# Patient Record
Sex: FEMALE | Race: WHITE | Employment: FULL TIME | URBAN - METROPOLITAN AREA
[De-identification: names, ages, dates, MRNs, and addresses within clinical notes are randomized per-mention and may not be internally consistent; named-entity substitution may affect disease eponyms.]

---

## 2018-07-31 ENCOUNTER — OFFICE VISIT (OUTPATIENT)
Dept: FAMILY MEDICINE CLINIC | Age: 53
End: 2018-07-31
Payer: COMMERCIAL

## 2018-07-31 VITALS
HEART RATE: 76 BPM | WEIGHT: 293 LBS | SYSTOLIC BLOOD PRESSURE: 138 MMHG | HEIGHT: 69 IN | DIASTOLIC BLOOD PRESSURE: 88 MMHG | BODY MASS INDEX: 43.4 KG/M2 | RESPIRATION RATE: 16 BRPM | TEMPERATURE: 98.6 F

## 2018-07-31 DIAGNOSIS — Z12.11 COLON CANCER SCREENING: ICD-10-CM

## 2018-07-31 DIAGNOSIS — I10 ESSENTIAL HYPERTENSION: Primary | ICD-10-CM

## 2018-07-31 DIAGNOSIS — E07.9 THYROID DYSFUNCTION: ICD-10-CM

## 2018-07-31 DIAGNOSIS — Z13.1 SCREENING FOR DIABETES MELLITUS: ICD-10-CM

## 2018-07-31 DIAGNOSIS — Z00.00 HEALTHCARE MAINTENANCE: ICD-10-CM

## 2018-07-31 DIAGNOSIS — R73.9 HYPERGLYCEMIA: ICD-10-CM

## 2018-07-31 DIAGNOSIS — Z12.39 BREAST CANCER SCREENING: ICD-10-CM

## 2018-07-31 PROBLEM — Z99.89 OSA ON CPAP: Status: ACTIVE | Noted: 2018-07-31

## 2018-07-31 PROBLEM — G47.33 OSA ON CPAP: Status: ACTIVE | Noted: 2018-07-31

## 2018-07-31 PROCEDURE — 3017F COLORECTAL CA SCREEN DOC REV: CPT | Performed by: FAMILY MEDICINE

## 2018-07-31 PROCEDURE — 99203 OFFICE O/P NEW LOW 30 MIN: CPT | Performed by: FAMILY MEDICINE

## 2018-07-31 PROCEDURE — G8417 CALC BMI ABV UP PARAM F/U: HCPCS | Performed by: FAMILY MEDICINE

## 2018-07-31 PROCEDURE — G8427 DOCREV CUR MEDS BY ELIG CLIN: HCPCS | Performed by: FAMILY MEDICINE

## 2018-07-31 PROCEDURE — 1036F TOBACCO NON-USER: CPT | Performed by: FAMILY MEDICINE

## 2018-07-31 RX ORDER — AMLODIPINE BESYLATE 10 MG/1
1 TABLET ORAL DAILY
Refills: 1 | COMMUNITY
Start: 2018-06-22 | End: 2018-07-31 | Stop reason: SDUPTHER

## 2018-07-31 RX ORDER — HYDROCHLOROTHIAZIDE 25 MG/1
1 TABLET ORAL DAILY
Refills: 1 | COMMUNITY
Start: 2018-06-22 | End: 2018-07-31 | Stop reason: SDUPTHER

## 2018-07-31 RX ORDER — AMLODIPINE BESYLATE 10 MG/1
10 TABLET ORAL DAILY
Qty: 90 TABLET | Refills: 1 | Status: SHIPPED | OUTPATIENT
Start: 2018-07-31 | End: 2022-06-20

## 2018-07-31 RX ORDER — HYDROCHLOROTHIAZIDE 25 MG/1
25 TABLET ORAL DAILY
Qty: 90 TABLET | Refills: 1 | Status: SHIPPED | OUTPATIENT
Start: 2018-07-31 | End: 2022-06-21

## 2018-07-31 ASSESSMENT — PATIENT HEALTH QUESTIONNAIRE - PHQ9
1. LITTLE INTEREST OR PLEASURE IN DOING THINGS: 0
SUM OF ALL RESPONSES TO PHQ QUESTIONS 1-9: 0
2. FEELING DOWN, DEPRESSED OR HOPELESS: 0
SUM OF ALL RESPONSES TO PHQ9 QUESTIONS 1 & 2: 0

## 2018-07-31 NOTE — PATIENT INSTRUCTIONS
Patient Education        High Blood Pressure: Care Instructions  Your Care Instructions    If your blood pressure is usually above 130/80, you have high blood pressure, or hypertension. That means the top number is 130 or higher or the bottom number is 80 or higher, or both. Despite what a lot of people think, high blood pressure usually doesn't cause headaches or make you feel dizzy or lightheaded. It usually has no symptoms. But it does increase your risk for heart attack, stroke, and kidney or eye damage. The higher your blood pressure, the more your risk increases. Your doctor will give you a goal for your blood pressure. Your goal will be based on your health and your age. Lifestyle changes, such as eating healthy and being active, are always important to help lower blood pressure. You might also take medicine to reach your blood pressure goal.  Follow-up care is a key part of your treatment and safety. Be sure to make and go to all appointments, and call your doctor if you are having problems. It's also a good idea to know your test results and keep a list of the medicines you take. How can you care for yourself at home? Medical treatment  · If you stop taking your medicine, your blood pressure will go back up. You may take one or more types of medicine to lower your blood pressure. Be safe with medicines. Take your medicine exactly as prescribed. Call your doctor if you think you are having a problem with your medicine. · Talk to your doctor before you start taking aspirin every day. Aspirin can help certain people lower their risk of a heart attack or stroke. But taking aspirin isn't right for everyone, because it can cause serious bleeding. · See your doctor regularly. You may need to see the doctor more often at first or until your blood pressure comes down.   · If you are taking blood pressure medicine, talk to your doctor before you take decongestants or anti-inflammatory medicine, such as irregular heartbeat.     · You have symptoms of a stroke. These may include:  ¨ Sudden numbness, tingling, weakness, or loss of movement in your face, arm, or leg, especially on only one side of your body. ¨ Sudden vision changes. ¨ Sudden trouble speaking. ¨ Sudden confusion or trouble understanding simple statements. ¨ Sudden problems with walking or balance. ¨ A sudden, severe headache that is different from past headaches.     · You have severe back or belly pain.    Do not wait until your blood pressure comes down on its own. Get help right away.   Call your doctor now or seek immediate care if:    · Your blood pressure is much higher than normal (such as 180/110 or higher), but you don't have symptoms.     · You think high blood pressure is causing symptoms, such as:  ¨ Severe headache. ¨ Blurry vision.    Watch closely for changes in your health, and be sure to contact your doctor if:    · Your blood pressure measures 140/90 or higher at least 2 times. That means the top number is 140 or higher or the bottom number is 90 or higher, or both.     · You think you may be having side effects from your blood pressure medicine.     · Your blood pressure is usually normal, but it goes above normal at least 2 times. Where can you learn more? Go to https://Waffl.com.BIXI. org and sign in to your [employer] account. Enter F760 in the [employer] box to learn more about \"High Blood Pressure: Care Instructions. \"     If you do not have an account, please click on the \"Sign Up Now\" link. Current as of: December 6, 2017  Content Version: 11.6  © 9266-7182 [employer]. Care instructions adapted under license by [employer] [facility] ([facility]). If you have questions about a medical condition or this instruction, always ask your healthcare professional. [place] [address] any warranty or liability for your use of this information.

## 2018-07-31 NOTE — PROGRESS NOTES
on file     Other Topics Concern    Not on file     Social History Narrative    No narrative on file       Allergies   Allergen Reactions    Latex Itching    Pcn [Penicillins] Anaphylaxis     Stops breathing    Shellfish-Derived Products Itching       Review of Systems - General ROS: negative for - fatigue, fever, malaise, night sweats, sleep disturbance or weight loss  Psychological ROS: negative for - anxiety, concentration difficulties, depression, memory difficulties or sleep disturbances  ENT ROS: negative for - hearing change, nasal discharge, oral lesions, sinus pain, sore throat, tinnitus or vertigo  Allergy and Immunology ROS: negative for - hives, nasal congestion or seasonal allergies  Hematological and Lymphatic ROS: negative for - bruising, fatigue, night sweats or pallor  Endocrine ROS: negative for - hot flashes, malaise/lethargy, palpitations, polydipsia/polyuria, skin changes, temperature intolerance or unexpected weight changes  Respiratory ROS: negative for - cough, hemoptysis, pleuritic pain, shortness of breath or wheezing  Cardiovascular ROS: no chest pain or dyspnea on exertion  Gastrointestinal ROS: no abdominal pain, change in bowel habits, or black or bloody stools  Genito-Urinary ROS: no dysuria, trouble voiding, or hematuria  Musculoskeletal ROS: negative for - joint pain, joint stiffness, joint swelling, muscle pain or muscular weakness  Neurological ROS: negative for - dizziness, gait disturbance, headaches, impaired coordination/balance, numbness/tingling, tremors or visual changes  Dermatological ROS: negative for - dry skin, lumps, pruritus or rash    Blood pressure 138/88, pulse 76, temperature 98.6 °F (37 °C), temperature source Temporal, resp. rate 16, height 5' 9.25\" (1.759 m), weight (!) 313 lb (142 kg), last menstrual period 07/17/2018.     Physical Examination: General appearance - alert, well appearing, and in no distress  Mental status - alert, oriented to person, place, Standing Expiration Date:   7/31/2019    TSH Without Reflex     Standing Status:   Future     Standing Expiration Date:   7/31/2019   [id number] [facility])     Referral Priority:   Routine     Referral Type:   Eval and Treat     Referral Reason:   Specialty Services Required     Requested Specialty:   Gastroenterology     Number of Visits Requested:   1       Outpatient Encounter Prescriptions as of 7/31/2018   Medication Sig Dispense Refill    Barberry-Oreg Grape-Goldenseal (BERBERINE COMPLEX PO) Take 1 capsule by mouth daily      MILK THISTLE PLUS PO Take 1 capsule by mouth daily      amLODIPine (NORVASC) 10 MG tablet Take 1 tablet by mouth daily 90 tablet 1    hydrochlorothiazide (HYDRODIURIL) 25 MG tablet Take 1 tablet by mouth daily 90 tablet 1    [DISCONTINUED] amLODIPine (NORVASC) 10 MG tablet Take 1 tablet by mouth daily  1    [DISCONTINUED] hydrochlorothiazide (HYDRODIURIL) 25 MG tablet Take 1 tablet by mouth daily  1     No facility-administered encounter medications on file as of 7/31/2018. The nature of cardiac risk has been fully discussed with this patient. I have made her aware of her LDL target goal given her cardiovascular risk analysis. I have discussed the appropriate diet. The need for lifelong compliance in order to reduce risk is stressed. A regular exercise program is recommended to help achieve and maintain normal body weight, fitness and improve lipid balance. A written copy of a low fat, low cholesterol diet has been given to the patient. Patient education provided. They understand and agree with this course of treatment. They will return with new or worsening symptoms. Patient instructed to remain current with appropriate annual health maintenance.    Follow up after test result and prn

## 2018-08-23 ENCOUNTER — HOSPITAL ENCOUNTER (OUTPATIENT)
Dept: WOMENS IMAGING | Age: 53
Discharge: HOME | End: 2018-08-25
Payer: COMMERCIAL

## 2018-08-23 DIAGNOSIS — Z12.39 BREAST CANCER SCREENING: ICD-10-CM

## 2018-08-23 PROCEDURE — 77067 SCR MAMMO BI INCL CAD: CPT

## 2018-12-01 ENCOUNTER — PATIENT MESSAGE (OUTPATIENT)
Dept: FAMILY MEDICINE CLINIC | Age: 53
End: 2018-12-01

## 2018-12-02 NOTE — TELEPHONE ENCOUNTER
From: Gerald Erickson  To: Benjamin Mcintyre MD  Sent: 12/1/2018 3:51 PM EST  Subject: Prescription Question    I got thru the mail my copy of blood work to be done. I had dental surgery and they had to do 2 blood test and my insurance won't pay for any more blood test for 2018. I will hold the paperwork for January when I return from [place] and have that done. Thanks for sending another copy. I still have his script and have yet to fill it , I will need to in two weeks. I did get my breast test done and colonoscopy will be done next year.    Thanks so much and gonzalo Mccollum/ happy new year to everyone there!!!

## 2019-07-06 ENCOUNTER — HOSPITAL ENCOUNTER (EMERGENCY)
Age: 54
Discharge: HOME | End: 2019-07-06
Attending: EMERGENCY MEDICINE
Payer: COMMERCIAL

## 2019-07-06 ENCOUNTER — APPOINTMENT (OUTPATIENT)
Dept: GENERAL RADIOLOGY | Age: 54
End: 2019-07-06
Payer: COMMERCIAL

## 2019-07-06 VITALS
HEIGHT: 69 IN | OXYGEN SATURATION: 98 % | HEART RATE: 96 BPM | BODY MASS INDEX: 43.4 KG/M2 | WEIGHT: 293 LBS | SYSTOLIC BLOOD PRESSURE: 195 MMHG | DIASTOLIC BLOOD PRESSURE: 98 MMHG | TEMPERATURE: 98.7 F | RESPIRATION RATE: 18 BRPM

## 2019-07-06 DIAGNOSIS — E87.6 HYPOKALEMIA: ICD-10-CM

## 2019-07-06 DIAGNOSIS — R10.13 ABDOMINAL PAIN, EPIGASTRIC: ICD-10-CM

## 2019-07-06 DIAGNOSIS — R07.89 ATYPICAL CHEST PAIN: Primary | ICD-10-CM

## 2019-07-06 LAB
ALBUMIN SERPL-MCNC: 4.8 G/DL (ref 3.5–4.6)
ALP BLD-CCNC: 116 U/L (ref 40–130)
ALT SERPL-CCNC: 39 U/L (ref 0–33)
ANION GAP SERPL CALCULATED.3IONS-SCNC: 18 MEQ/L (ref 9–15)
AST SERPL-CCNC: 36 U/L (ref 0–35)
BASOPHILS ABSOLUTE: 0 K/UL (ref 0–0.2)
BASOPHILS RELATIVE PERCENT: 0.5 %
BILIRUB SERPL-MCNC: 0.4 MG/DL (ref 0.2–0.7)
BUN BLDV-MCNC: 15 MG/DL (ref 6–20)
CALCIUM SERPL-MCNC: 10.1 MG/DL (ref 8.5–9.9)
CHLORIDE BLD-SCNC: 95 MEQ/L (ref 95–107)
CO2: 26 MEQ/L (ref 20–31)
CREAT SERPL-MCNC: 0.75 MG/DL (ref 0.5–0.9)
EKG ATRIAL RATE: 108 BPM
EKG P AXIS: 48 DEGREES
EKG P-R INTERVAL: 142 MS
EKG Q-T INTERVAL: 376 MS
EKG QRS DURATION: 104 MS
EKG QTC CALCULATION (BAZETT): 503 MS
EKG R AXIS: -40 DEGREES
EKG T AXIS: 42 DEGREES
EKG VENTRICULAR RATE: 108 BPM
EOSINOPHILS ABSOLUTE: 0.1 K/UL (ref 0–0.7)
EOSINOPHILS RELATIVE PERCENT: 1.6 %
GFR AFRICAN AMERICAN: >60
GFR NON-AFRICAN AMERICAN: >60
GLOBULIN: 4.2 G/DL (ref 2.3–3.5)
GLUCOSE BLD-MCNC: 161 MG/DL (ref 70–99)
HCT VFR BLD CALC: 46.5 % (ref 37–47)
HEMOGLOBIN: 15.2 G/DL (ref 12–16)
HYPOCHROMIA: PRESENT
LYMPHOCYTES ABSOLUTE: 1.7 K/UL (ref 1–4.8)
LYMPHOCYTES RELATIVE PERCENT: 20.7 %
MCH RBC QN AUTO: 26 PG (ref 27–31.3)
MCHC RBC AUTO-ENTMCNC: 32.8 % (ref 33–37)
MCV RBC AUTO: 79.2 FL (ref 82–100)
MONOCYTES ABSOLUTE: 0.8 K/UL (ref 0.2–0.8)
MONOCYTES RELATIVE PERCENT: 9.5 %
NEUTROPHILS ABSOLUTE: 5.5 K/UL (ref 1.4–6.5)
NEUTROPHILS RELATIVE PERCENT: 67.7 %
PDW BLD-RTO: 14.6 % (ref 11.5–14.5)
PLATELET # BLD: 229 K/UL (ref 130–400)
POTASSIUM SERPL-SCNC: 2.7 MEQ/L (ref 3.4–4.9)
RBC # BLD: 5.87 M/UL (ref 4.2–5.4)
SODIUM BLD-SCNC: 139 MEQ/L (ref 135–144)
TOTAL PROTEIN: 9 G/DL (ref 6.3–8)
TROPONIN: <0.01 NG/ML (ref 0–0.01)
WBC # BLD: 8.1 K/UL (ref 4.8–10.8)

## 2019-07-06 PROCEDURE — 85025 COMPLETE CBC W/AUTO DIFF WBC: CPT

## 2019-07-06 PROCEDURE — 80053 COMPREHEN METABOLIC PANEL: CPT

## 2019-07-06 PROCEDURE — 6370000000 HC RX 637 (ALT 250 FOR IP): Performed by: EMERGENCY MEDICINE

## 2019-07-06 PROCEDURE — 71045 X-RAY EXAM CHEST 1 VIEW: CPT

## 2019-07-06 PROCEDURE — 36415 COLL VENOUS BLD VENIPUNCTURE: CPT

## 2019-07-06 PROCEDURE — 93005 ELECTROCARDIOGRAM TRACING: CPT

## 2019-07-06 PROCEDURE — 99285 EMERGENCY DEPT VISIT HI MDM: CPT

## 2019-07-06 PROCEDURE — 84484 ASSAY OF TROPONIN QUANT: CPT

## 2019-07-06 RX ORDER — POTASSIUM CHLORIDE 750 MG/1
40 TABLET, EXTENDED RELEASE ORAL ONCE
Status: COMPLETED | OUTPATIENT
Start: 2019-07-06 | End: 2019-07-06

## 2019-07-06 RX ORDER — ROSUVASTATIN CALCIUM 20 MG/1
20 TABLET, COATED ORAL DAILY
COMMUNITY
End: 2022-06-20

## 2019-07-06 RX ORDER — POTASSIUM CHLORIDE 20 MEQ/1
40 TABLET, EXTENDED RELEASE ORAL DAILY
Qty: 60 TABLET | Refills: 0 | Status: SHIPPED | OUTPATIENT
Start: 2019-07-06 | End: 2022-04-18

## 2019-07-06 RX ADMIN — POTASSIUM CHLORIDE 40 MEQ: 10 TABLET, EXTENDED RELEASE ORAL at 05:42

## 2019-07-06 ASSESSMENT — ENCOUNTER SYMPTOMS
ABDOMINAL PAIN: 1
COUGH: 0
NAUSEA: 0
SHORTNESS OF BREATH: 0
BACK PAIN: 0
VOMITING: 0

## 2019-07-06 ASSESSMENT — PAIN - FUNCTIONAL ASSESSMENT: PAIN_FUNCTIONAL_ASSESSMENT: PREVENTS OR INTERFERES SOME ACTIVE ACTIVITIES AND ADLS

## 2019-07-06 ASSESSMENT — PAIN DESCRIPTION - DESCRIPTORS: DESCRIPTORS: PRESSURE

## 2019-07-06 ASSESSMENT — PAIN DESCRIPTION - PROGRESSION: CLINICAL_PROGRESSION: NOT CHANGED

## 2019-07-06 ASSESSMENT — PAIN DESCRIPTION - ONSET: ONSET: AWAKENED FROM SLEEP

## 2019-07-06 ASSESSMENT — PAIN DESCRIPTION - ORIENTATION: ORIENTATION: RIGHT;MID

## 2019-07-06 ASSESSMENT — PAIN DESCRIPTION - LOCATION: LOCATION: CHEST

## 2019-07-06 ASSESSMENT — PAIN DESCRIPTION - FREQUENCY: FREQUENCY: CONTINUOUS

## 2019-07-06 ASSESSMENT — PAIN DESCRIPTION - PAIN TYPE: TYPE: ACUTE PAIN

## 2019-07-06 ASSESSMENT — PAIN SCALES - GENERAL: PAINLEVEL_OUTOF10: 6

## 2019-07-06 NOTE — ED PROVIDER NOTES
[address] [place] [address] ED  eMERGENCY dEPARTMENT eNCOUnter      Pt Name: Alejandro Herrera  MRN: [id number]  [place] 1965  Date of evaluation: 7/6/2019  Provider: Nilson Olivera MD    [address] [place] [address]       Chief Complaint   Patient presents with    Chest Pain     Woke up with Chest Tightness          HISTORY OF PRESENT ILLNESS   (Location/Symptom, Timing/Onset,Context/Setting, Quality, Duration, Modifying Factors, Severity)  Note limiting factors. Alejandro Herrera is a 48 y.o. female who presents to the emergency department with sensation of pressure and discomfort to the entire anterior chest and upper abdomen which he noted this evening before bed. She felt more comfortable prop herself on an extra pillow when she put her CPAP mask on. She woke with some continued discomfort and decided to be evaluated. However she is having spontaneous resolution of symptoms prior to my evaluation. No history of cardiac disease. She does have history of hypertension and high cholesterol. Nonsmoker. No family history of cardiac disease. No cough congestion shortness of breath. No diaphoresis or nausea. He does radiate to her arms neck or back. No pain to move twist or bend. No recent travel or risks for thromboembolism. Patient indicates that she did eat a pork ribs for the July 4 and left overs today and feels that he had some effect on her chest/abdominal symptoms. The history is provided by the patient. NursingNotes were reviewed. REVIEW OF SYSTEMS    (2-9 systems for level 4, 10 or more for level 5)     Review of Systems   Constitutional: Negative for diaphoresis. HENT: Negative for congestion. Respiratory: Negative for cough and shortness of breath. Cardiovascular: Positive for chest pain. Negative for leg swelling. Gastrointestinal: Positive for abdominal pain. Negative for nausea and vomiting. Genitourinary: Negative for dysuria.    Musculoskeletal: Negative for back pain and

## 2021-09-28 LAB — MAMMOGRAPHY, EXTERNAL: NORMAL

## 2022-04-18 ENCOUNTER — OFFICE VISIT (OUTPATIENT)
Dept: FAMILY MEDICINE CLINIC | Age: 57
End: 2022-04-18
Payer: COMMERCIAL

## 2022-04-18 VITALS
DIASTOLIC BLOOD PRESSURE: 86 MMHG | TEMPERATURE: 98.5 F | HEART RATE: 77 BPM | SYSTOLIC BLOOD PRESSURE: 136 MMHG | HEIGHT: 68 IN | BODY MASS INDEX: 44.41 KG/M2 | WEIGHT: 293 LBS | OXYGEN SATURATION: 97 %

## 2022-04-18 DIAGNOSIS — E78.5 HYPERLIPIDEMIA, UNSPECIFIED HYPERLIPIDEMIA TYPE: ICD-10-CM

## 2022-04-18 DIAGNOSIS — Z11.59 ENCOUNTER FOR HEPATITIS C SCREENING TEST FOR LOW RISK PATIENT: ICD-10-CM

## 2022-04-18 DIAGNOSIS — Z13.1 DIABETES MELLITUS SCREENING: ICD-10-CM

## 2022-04-18 DIAGNOSIS — Z12.11 COLON CANCER SCREENING: ICD-10-CM

## 2022-04-18 DIAGNOSIS — I10 ESSENTIAL HYPERTENSION: Primary | ICD-10-CM

## 2022-04-18 DIAGNOSIS — Z12.31 ENCOUNTER FOR SCREENING MAMMOGRAM FOR MALIGNANT NEOPLASM OF BREAST: ICD-10-CM

## 2022-04-18 PROCEDURE — G8427 DOCREV CUR MEDS BY ELIG CLIN: HCPCS | Performed by: FAMILY MEDICINE

## 2022-04-18 PROCEDURE — 99204 OFFICE O/P NEW MOD 45 MIN: CPT | Performed by: FAMILY MEDICINE

## 2022-04-18 PROCEDURE — 1036F TOBACCO NON-USER: CPT | Performed by: FAMILY MEDICINE

## 2022-04-18 PROCEDURE — G8419 CALC BMI OUT NRM PARAM NOF/U: HCPCS | Performed by: FAMILY MEDICINE

## 2022-04-18 PROCEDURE — 3017F COLORECTAL CA SCREEN DOC REV: CPT | Performed by: FAMILY MEDICINE

## 2022-04-18 RX ORDER — MAGNESIUM OXIDE 400 MG/1
400 TABLET ORAL NIGHTLY
COMMUNITY
Start: 2021-11-22 | End: 2022-11-22

## 2022-04-18 RX ORDER — LOSARTAN POTASSIUM 100 MG/1
TABLET ORAL
COMMUNITY
Start: 2022-03-22 | End: 2022-06-20

## 2022-04-18 RX ORDER — ESCITALOPRAM OXALATE 20 MG/1
20 TABLET ORAL DAILY
COMMUNITY
Start: 2021-11-22 | End: 2022-06-20

## 2022-04-18 SDOH — ECONOMIC STABILITY: FOOD INSECURITY: WITHIN THE PAST 12 MONTHS, YOU WORRIED THAT YOUR FOOD WOULD RUN OUT BEFORE YOU GOT MONEY TO BUY MORE.: NEVER TRUE

## 2022-04-18 SDOH — ECONOMIC STABILITY: FOOD INSECURITY: WITHIN THE PAST 12 MONTHS, THE FOOD YOU BOUGHT JUST DIDN'T LAST AND YOU DIDN'T HAVE MONEY TO GET MORE.: NEVER TRUE

## 2022-04-18 ASSESSMENT — SOCIAL DETERMINANTS OF HEALTH (SDOH): HOW HARD IS IT FOR YOU TO PAY FOR THE VERY BASICS LIKE FOOD, HOUSING, MEDICAL CARE, AND HEATING?: NOT HARD AT ALL

## 2022-04-18 ASSESSMENT — PATIENT HEALTH QUESTIONNAIRE - PHQ9
SUM OF ALL RESPONSES TO PHQ QUESTIONS 1-9: 0
SUM OF ALL RESPONSES TO PHQ QUESTIONS 1-9: 0
1. LITTLE INTEREST OR PLEASURE IN DOING THINGS: 0
SUM OF ALL RESPONSES TO PHQ9 QUESTIONS 1 & 2: 0
SUM OF ALL RESPONSES TO PHQ QUESTIONS 1-9: 0
SUM OF ALL RESPONSES TO PHQ QUESTIONS 1-9: 0
2. FEELING DOWN, DEPRESSED OR HOPELESS: 0

## 2022-04-18 ASSESSMENT — ENCOUNTER SYMPTOMS
CHEST TIGHTNESS: 0
APNEA: 0
VOMITING: 0
CONSTIPATION: 0
SHORTNESS OF BREATH: 0
ABDOMINAL PAIN: 0
DIARRHEA: 0
NAUSEA: 0
COUGH: 0
BLOOD IN STOOL: 0

## 2022-04-18 NOTE — PROGRESS NOTES
Subjective:      Patient ID: Vladimir Stanton is a 49 y.o. female who presents today for:     Chief Complaint   Patient presents with    New Patient     est care, states she is insulin resistant, would like to discuss weight loss        HPI    Patient is a very pleasant 53-year-old female presents today to establish care. 1.  hypertension: Present for many years patient has been controlled on Norvasc, hydrochlorothiazide and losartan. She had had swelling in previously but had a severe case of high area hypokalemia due to hydrochlorothiazide and losartan was added and potassium levels normalized. She denies any chest pain, shortness of breath or lower extremity edema    2. Anxiety: Related to newer onset menopause. She states that symptoms are well controlled    3. Insulin resistant: Last hemoglobin A1c was 5.6. Patient has struggled with weight loss but is very motivated. Past Medical History:   Diagnosis Date    Anxiety     Endometriosis     Fatty liver     History of cervical cancer     HTN (hypertension)     Seasonal allergies      History reviewed. No pertinent surgical history.   Family History   Problem Relation Age of Onset    High Blood Pressure Mother     Diabetes Mother         type 2    Diabetes Father         type 1    Heart Disease Father     High Blood Pressure Brother      Social History     Socioeconomic History    Marital status:      Spouse name: Not on file    Number of children: Not on file    Years of education: Not on file    Highest education level: Not on file   Occupational History    Not on file   Tobacco Use    Smoking status: Never Smoker    Smokeless tobacco: Never Used   Substance and Sexual Activity    Alcohol use: Yes     Comment: socially, rare    Drug use: Never    Sexual activity: Not on file   Other Topics Concern    Not on file   Social History Narrative    Not on file     Social Determinants of Health     Financial Resource Strain: Low Risk     Difficulty of Paying Living Expenses: Not hard at all   Food Insecurity: No Food Insecurity    Worried About Running Out of Food in the Last Year: Never true    Patrice of Food in the Last Year: Never true   Transportation Needs:     Lack of Transportation (Medical): Not on file    Lack of Transportation (Non-Medical): Not on file   Physical Activity:     Days of Exercise per Week: Not on file    Minutes of Exercise per Session: Not on file   Stress:     Feeling of Stress : Not on file   Social Connections:     Frequency of Communication with Friends and Family: Not on file    Frequency of Social Gatherings with Friends and Family: Not on file    Attends Yarsanism Services: Not on file    Active Member of [address] [place] [address] [employer] or Organizations: Not on file    Attends Club or Organization Meetings: Not on file    Marital Status: Not on file   Intimate Partner Violence:     Fear of Current or Ex-Partner: Not on file    Emotionally Abused: Not on file    Physically Abused: Not on file    Sexually Abused: Not on file   Housing Stability:     Unable to Pay for Housing in the Last Year: Not on file    Number of [place] in the Last Year: Not on file    Unstable Housing in the Last Year: Not on file     Current Outpatient Medications on File Prior to Visit   Medication Sig Dispense Refill    magnesium oxide (MAG-OX) 400 MG tablet Take 400 mg by mouth nightly      losartan (COZAAR) 100 MG tablet Take 1 tablet by mouth once daily.  escitalopram (LEXAPRO) 20 MG tablet Take 20 mg by mouth daily      rosuvastatin (CRESTOR) 20 MG tablet Take 20 mg by mouth daily      amLODIPine (NORVASC) 10 MG tablet Take 1 tablet by mouth daily 90 tablet 1    hydrochlorothiazide (HYDRODIURIL) 25 MG tablet Take 1 tablet by mouth daily 90 tablet 1     No current facility-administered medications on file prior to visit.        Allergies:  Latex, Pcn [penicillins], and Shellfish-derived products    Review of Systems Constitutional: Negative for activity change, appetite change and fatigue. Respiratory: Negative for apnea, cough, chest tightness and shortness of breath. Cardiovascular: Negative for chest pain, palpitations and leg swelling. Gastrointestinal: Negative for abdominal pain, blood in stool, constipation, diarrhea, nausea and vomiting. Musculoskeletal: Negative for arthralgias. Neurological: Negative for seizures and headaches. Psychiatric/Behavioral: Negative for hallucinations and suicidal ideas. Objective:   /86   Pulse 77   Temp 98.5 °F (36.9 °C) (Infrared)   Ht 5' 7.5\" (1.715 m)   Wt (!) 312 lb 3.2 oz (141.6 kg)   SpO2 97%   BMI 48.18 kg/m²     Physical Exam  Vitals and nursing note reviewed. Constitutional:       General: She is not in acute distress. Appearance: Normal appearance. She is well-developed. She is obese. She is not diaphoretic. HENT:      Head: Normocephalic and atraumatic. Nose: Nose normal.      Mouth/Throat:      Mouth: Mucous membranes are moist.      Pharynx: Oropharynx is clear. Eyes:      Conjunctiva/sclera: Conjunctivae normal.      Pupils: Pupils are equal, round, and reactive to light. Cardiovascular:      Rate and Rhythm: Normal rate and regular rhythm. Heart sounds: Normal heart sounds. No murmur heard. No friction rub. No gallop. Pulmonary:      Effort: Pulmonary effort is normal. No respiratory distress. Breath sounds: Normal breath sounds. No wheezing or rales. Chest:      Chest wall: No tenderness. Abdominal:      General: Abdomen is flat. Bowel sounds are normal.      Palpations: Abdomen is soft. Tenderness: There is no abdominal tenderness. Musculoskeletal:      Cervical back: Normal range of motion. Skin:     General: Skin is warm and dry. Neurological:      Mental Status: She is alert and oriented to person, place, and time.    Psychiatric:         Behavior: Behavior normal.         Thought Content: Thought content normal.         Judgment: Judgment normal.         Assessment & Plan:     1. Essential hypertension  Controlled but not at goal.  Will continue current medication, check blood work and have patient monitor blood pressure at home  Continue to focus on weight loss  - CBC with Auto Differential; Future  - Lipid, Fasting; Future  - Comprehensive Metabolic Panel; Future    2. Hyperlipidemia, unspecified hyperlipidemia type  We will check lipid panel and titrate medication accordingly  - Lipid, Fasting; Future    3. Diabetes mellitus screening  - Hemoglobin A1C; Future    4. Encounter for hepatitis C screening test for low risk patient  - Hepatitis C Antibody; Future    5. Colon cancer screening  Advised colonoscopy as gold standard but patient would elect Cologuard instead  - Cologuard    6.  Encounter for screening mammogram for malignant neoplasm of breast  Mammogram performed last year in September, patient would like to have mammograms every other year      Return in about 6 months (around 10/18/2022) for Betty Carmichael MD

## 2022-05-05 ENCOUNTER — HOSPITAL ENCOUNTER (OUTPATIENT)
Dept: LAB | Age: 57
Discharge: HOME | End: 2022-05-05
Payer: COMMERCIAL

## 2022-05-05 DIAGNOSIS — Z13.1 DIABETES MELLITUS SCREENING: ICD-10-CM

## 2022-05-05 DIAGNOSIS — E78.5 HYPERLIPIDEMIA, UNSPECIFIED HYPERLIPIDEMIA TYPE: ICD-10-CM

## 2022-05-05 DIAGNOSIS — Z11.59 ENCOUNTER FOR HEPATITIS C SCREENING TEST FOR LOW RISK PATIENT: ICD-10-CM

## 2022-05-05 DIAGNOSIS — I10 ESSENTIAL HYPERTENSION: ICD-10-CM

## 2022-05-05 LAB
ALBUMIN SERPL-MCNC: 4.2 G/DL (ref 3.5–4.6)
ALP BLD-CCNC: 90 U/L (ref 40–130)
ALT SERPL-CCNC: 39 U/L (ref 0–33)
ANION GAP SERPL CALCULATED.3IONS-SCNC: 12 MEQ/L (ref 9–15)
AST SERPL-CCNC: 38 U/L (ref 0–35)
BASOPHILS ABSOLUTE: 0.1 K/UL (ref 0–0.2)
BASOPHILS RELATIVE PERCENT: 0.9 %
BILIRUB SERPL-MCNC: 0.5 MG/DL (ref 0.2–0.7)
BUN BLDV-MCNC: 13 MG/DL (ref 6–20)
CALCIUM SERPL-MCNC: 9.3 MG/DL (ref 8.5–9.9)
CHLORIDE BLD-SCNC: 102 MEQ/L (ref 95–107)
CHOLESTEROL, FASTING: 160 MG/DL (ref 0–199)
CO2: 24 MEQ/L (ref 20–31)
CREAT SERPL-MCNC: 0.74 MG/DL (ref 0.5–0.9)
EOSINOPHILS ABSOLUTE: 0.2 K/UL (ref 0–0.7)
EOSINOPHILS RELATIVE PERCENT: 3.5 %
GFR AFRICAN AMERICAN: >60
GFR NON-AFRICAN AMERICAN: >60
GLOBULIN: 3.7 G/DL (ref 2.3–3.5)
GLUCOSE BLD-MCNC: 101 MG/DL (ref 70–99)
HBA1C MFR BLD: 5.9 % (ref 4.8–5.9)
HCT VFR BLD CALC: 45.2 % (ref 37–47)
HDLC SERPL-MCNC: 37 MG/DL (ref 40–59)
HEMOGLOBIN: 14.7 G/DL (ref 12–16)
LDL CHOLESTEROL CALCULATED: 100 MG/DL (ref 0–129)
LYMPHOCYTES ABSOLUTE: 1.2 K/UL (ref 1–4.8)
LYMPHOCYTES RELATIVE PERCENT: 21.2 %
MCH RBC QN AUTO: 27.4 PG (ref 27–31.3)
MCHC RBC AUTO-ENTMCNC: 32.6 % (ref 33–37)
MCV RBC AUTO: 84 FL (ref 82–100)
MONOCYTES ABSOLUTE: 0.4 K/UL (ref 0.2–0.8)
MONOCYTES RELATIVE PERCENT: 7.7 %
NEUTROPHILS ABSOLUTE: 3.6 K/UL (ref 1.4–6.5)
NEUTROPHILS RELATIVE PERCENT: 66.7 %
PDW BLD-RTO: 13.8 % (ref 11.5–14.5)
PLATELET # BLD: 199 K/UL (ref 130–400)
POTASSIUM SERPL-SCNC: 4.2 MEQ/L (ref 3.4–4.9)
RBC # BLD: 5.38 M/UL (ref 4.2–5.4)
SODIUM BLD-SCNC: 138 MEQ/L (ref 135–144)
TOTAL PROTEIN: 7.9 G/DL (ref 6.3–8)
TRIGLYCERIDE, FASTING: 117 MG/DL (ref 0–150)
WBC # BLD: 5.4 K/UL (ref 4.8–10.8)

## 2022-05-05 PROCEDURE — 80061 LIPID PANEL: CPT

## 2022-05-05 PROCEDURE — 85025 COMPLETE CBC W/AUTO DIFF WBC: CPT

## 2022-05-05 PROCEDURE — 86803 HEPATITIS C AB TEST: CPT

## 2022-05-05 PROCEDURE — 36415 COLL VENOUS BLD VENIPUNCTURE: CPT

## 2022-05-05 PROCEDURE — 83036 HEMOGLOBIN GLYCOSYLATED A1C: CPT

## 2022-05-05 PROCEDURE — 80053 COMPREHEN METABOLIC PANEL: CPT

## 2022-05-06 DIAGNOSIS — R79.89 ELEVATED LFTS: ICD-10-CM

## 2022-05-06 DIAGNOSIS — K76.0 FATTY LIVER: Primary | ICD-10-CM

## 2022-05-06 LAB — HEPATITIS C ANTIBODY: NONREACTIVE

## 2022-05-17 ENCOUNTER — OFFICE VISIT (OUTPATIENT)
Dept: FAMILY MEDICINE CLINIC | Age: 57
End: 2022-05-17
Payer: COMMERCIAL

## 2022-05-17 VITALS
OXYGEN SATURATION: 99 % | DIASTOLIC BLOOD PRESSURE: 92 MMHG | TEMPERATURE: 98 F | SYSTOLIC BLOOD PRESSURE: 148 MMHG | BODY MASS INDEX: 48.36 KG/M2 | HEART RATE: 85 BPM | WEIGHT: 293 LBS

## 2022-05-17 DIAGNOSIS — I10 ESSENTIAL HYPERTENSION: ICD-10-CM

## 2022-05-17 DIAGNOSIS — R79.89 ELEVATED LFTS: Primary | ICD-10-CM

## 2022-05-17 DIAGNOSIS — E78.5 HYPERLIPIDEMIA, UNSPECIFIED HYPERLIPIDEMIA TYPE: ICD-10-CM

## 2022-05-17 PROCEDURE — G8417 CALC BMI ABV UP PARAM F/U: HCPCS | Performed by: FAMILY MEDICINE

## 2022-05-17 PROCEDURE — G8427 DOCREV CUR MEDS BY ELIG CLIN: HCPCS | Performed by: FAMILY MEDICINE

## 2022-05-17 PROCEDURE — 99213 OFFICE O/P EST LOW 20 MIN: CPT | Performed by: FAMILY MEDICINE

## 2022-05-17 PROCEDURE — 1036F TOBACCO NON-USER: CPT | Performed by: FAMILY MEDICINE

## 2022-05-17 PROCEDURE — 3017F COLORECTAL CA SCREEN DOC REV: CPT | Performed by: FAMILY MEDICINE

## 2022-05-17 NOTE — PROGRESS NOTES
Subjective:      Patient ID: Nilson Rodriguez is a 49 y.o. female who presents today for:     Chief Complaint   Patient presents with    Discuss Labs    Discuss Medications       HPI  Patient is a very pleasant 61-year-old female presents today to follow-up after recent lab work showed persistence of mild elevation of liver function test.  Denies any abdominal pain, melena, hematochezia or yellowing of skin or eyes. She has yet to follow-up with gastroenterology. She has history of known fatty liver disease but has not had an updated ultrasound in over 5 years. Hemoglobin A1c has fallen in the prediabetic range. Patient denies any polyuria or polydipsia  Past Medical History:   Diagnosis Date    Anxiety     Endometriosis     Fatty liver     History of cervical cancer     HTN (hypertension)     Seasonal allergies      History reviewed. No pertinent surgical history.   Family History   Problem Relation Age of Onset    High Blood Pressure Mother     Diabetes Mother         type 2    Diabetes Father         type 1    Heart Disease Father     High Blood Pressure Brother      Social History     Socioeconomic History    Marital status:      Spouse name: Not on file    Number of children: Not on file    Years of education: Not on file    Highest education level: Not on file   Occupational History    Not on file   Tobacco Use    Smoking status: Never Smoker    Smokeless tobacco: Never Used   Substance and Sexual Activity    Alcohol use: Yes     Comment: socially, rare    Drug use: Never    Sexual activity: Not on file   Other Topics Concern    Not on file   Social History Narrative    Not on file     Social Determinants of Health     Financial Resource Strain: Low Risk     Difficulty of Paying Living Expenses: Not hard at all   Food Insecurity: No Food Insecurity    Worried About [address] in the Last Year: Never true    [facility] in the Last Year: Never true Transportation Needs:     Lack of Transportation (Medical): Not on file    Lack of Transportation (Non-Medical): Not on file   Physical Activity:     Days of Exercise per Week: Not on file    Minutes of Exercise per Session: Not on file   Stress:     Feeling of Stress : Not on file   Social Connections:     Frequency of Communication with Friends and Family: Not on file    Frequency of Social Gatherings with Friends and Family: Not on file    Attends Caodaism Services: Not on file    Active Member of [address] [place] [address] [employer] or Organizations: Not on file    Attends Club or Organization Meetings: Not on file    Marital Status: Not on file   Intimate Partner Violence:     Fear of Current or Ex-Partner: Not on file    Emotionally Abused: Not on file    Physically Abused: Not on file    Sexually Abused: Not on file   Housing Stability:     Unable to Pay for Housing in the Last Year: Not on file    Number of [place] in the Last Year: Not on file    Unstable Housing in the Last Year: Not on file     Current Outpatient Medications on File Prior to Visit   Medication Sig Dispense Refill    magnesium oxide (MAG-OX) 400 MG tablet Take 400 mg by mouth nightly      losartan (COZAAR) 100 MG tablet Take 1 tablet by mouth once daily.  escitalopram (LEXAPRO) 20 MG tablet Take 20 mg by mouth daily      rosuvastatin (CRESTOR) 20 MG tablet Take 20 mg by mouth daily      amLODIPine (NORVASC) 10 MG tablet Take 1 tablet by mouth daily 90 tablet 1    hydrochlorothiazide (HYDRODIURIL) 25 MG tablet Take 1 tablet by mouth daily 90 tablet 1     No current facility-administered medications on file prior to visit. Allergies:  Latex, Pcn [penicillins], and Shellfish-derived products    Review of Systems   Constitutional: Negative for activity change, appetite change and fatigue. Respiratory: Negative for apnea, cough, chest tightness and shortness of breath.     Cardiovascular: Negative for chest pain, palpitations and leg swelling. Gastrointestinal: Negative for abdominal pain, blood in stool, constipation, diarrhea, nausea and vomiting. Musculoskeletal: Negative for arthralgias. Neurological: Negative for seizures and headaches. Psychiatric/Behavioral: Negative for hallucinations and suicidal ideas. Objective:   BP (!) 148/92   Pulse 85   Temp 98 °F (36.7 °C) (Infrared)   Wt (!) 313 lb 6.4 oz (142.2 kg)   SpO2 99%   BMI 48.36 kg/m²     Physical Exam  Vitals and nursing note reviewed. Constitutional:       General: She is not in acute distress. Appearance: Normal appearance. She is well-developed. She is obese. She is not diaphoretic. HENT:      Head: Normocephalic and atraumatic. Nose: Nose normal.      Mouth/Throat:      Mouth: Mucous membranes are moist.      Pharynx: Oropharynx is clear. Eyes:      Conjunctiva/sclera: Conjunctivae normal.      Pupils: Pupils are equal, round, and reactive to light. Cardiovascular:      Rate and Rhythm: Normal rate and regular rhythm. Heart sounds: Normal heart sounds. No murmur heard. No friction rub. No gallop. Pulmonary:      Effort: Pulmonary effort is normal. No respiratory distress. Breath sounds: Normal breath sounds. No wheezing or rales. Chest:      Chest wall: No tenderness. Abdominal:      General: Abdomen is flat. Bowel sounds are normal.      Palpations: Abdomen is soft. Tenderness: There is no abdominal tenderness. Musculoskeletal:      Cervical back: Normal range of motion. Skin:     General: Skin is warm and dry. Neurological:      Mental Status: She is alert and oriented to person, place, and time. Psychiatric:         Behavior: Behavior normal.         Thought Content: Thought content normal.         Judgment: Judgment normal.         Assessment & Plan:     1. Elevated LFTs  Stable: We will have patient follow-up with gastroenterology to revisit current state of fatty liver    2.  Essential hypertension  Controlled but not at goal.  Continue current medication however if persistently elevated will need to add another medication as patient is at maximum dose of amlodipine, hydrochlorothiazide and losartan    3. Hyperlipidemia, unspecified hyperlipidemia type  Continue current medication      Return in about 6 months (around 11/17/2022).     Jorge Salinas MD

## 2022-05-23 ENCOUNTER — OFFICE VISIT (OUTPATIENT)
Dept: GASTROENTEROLOGY | Age: 57
End: 2022-05-23
Payer: COMMERCIAL

## 2022-05-23 VITALS
BODY MASS INDEX: 48.61 KG/M2 | HEART RATE: 90 BPM | RESPIRATION RATE: 12 BRPM | SYSTOLIC BLOOD PRESSURE: 138 MMHG | WEIGHT: 293 LBS | DIASTOLIC BLOOD PRESSURE: 86 MMHG | OXYGEN SATURATION: 98 %

## 2022-05-23 DIAGNOSIS — R79.89 ABNORMAL LFTS: ICD-10-CM

## 2022-05-23 DIAGNOSIS — K76.0 FATTY LIVER: Primary | ICD-10-CM

## 2022-05-23 PROCEDURE — 1036F TOBACCO NON-USER: CPT | Performed by: INTERNAL MEDICINE

## 2022-05-23 PROCEDURE — 99204 OFFICE O/P NEW MOD 45 MIN: CPT | Performed by: INTERNAL MEDICINE

## 2022-05-23 PROCEDURE — G8417 CALC BMI ABV UP PARAM F/U: HCPCS | Performed by: INTERNAL MEDICINE

## 2022-05-23 PROCEDURE — 3017F COLORECTAL CA SCREEN DOC REV: CPT | Performed by: INTERNAL MEDICINE

## 2022-05-23 PROCEDURE — G8427 DOCREV CUR MEDS BY ELIG CLIN: HCPCS | Performed by: INTERNAL MEDICINE

## 2022-05-23 ASSESSMENT — ENCOUNTER SYMPTOMS
COLOR CHANGE: 0
VOICE CHANGE: 0
CHEST TIGHTNESS: 0
EYE REDNESS: 0
TROUBLE SWALLOWING: 0
BLOOD IN STOOL: 0
PHOTOPHOBIA: 0
VOMITING: 0
ABDOMINAL DISTENTION: 0
EYE PAIN: 0
NAUSEA: 0
RECTAL PAIN: 0
CONSTIPATION: 0
DIARRHEA: 0
ABDOMINAL PAIN: 0
WHEEZING: 0
SHORTNESS OF BREATH: 0

## 2022-05-23 NOTE — PROGRESS NOTES
Subjective:      Patient ID: Wilian Bell is a 49 y.o. female who presents today for:  Chief Complaint   Patient presents with    New Patient       HPI  This is a very pleasant 53-year-old who came in today for the evaluation management of abnormal liver enzymes. Patient mentioned that she had abnormal liver enzymes and and also she reported that she has been having insulin resistance. She reports that she tried weight loss however was not successful. Patient had persistent elevation of liver enzymes less than 2 times normal range. Does have history of hypertension, dyslipidemia and told that she had fatty liver. No recent imaging reported. No family history of liver diseases. No alcohol use. No history of jaundice. No history of viral hepatitis. Patient came in today to establish care and for further management  Past Medical History:   Diagnosis Date    Anxiety     Endometriosis     Fatty liver     History of cervical cancer     HTN (hypertension)     Seasonal allergies      No past surgical history on file. Social History     Socioeconomic History    Marital status:      Spouse name: Not on file    Number of children: Not on file    Years of education: Not on file    Highest education level: Not on file   Occupational History    Not on file   Tobacco Use    Smoking status: Never Smoker    Smokeless tobacco: Never Used   Substance and Sexual Activity    Alcohol use: Yes     Comment: socially, rare    Drug use: Never    Sexual activity: Not on file   Other Topics Concern    Not on file   Social History Narrative    Not on file     Social Determinants of Health     Financial Resource Strain: Low Risk     Difficulty of Paying Living Expenses: Not hard at all   Food Insecurity: No Food Insecurity    Worried About Running Out of Food in the Last Year: Never true    [address] in the Last Year: Never true   Transportation Needs:     Lack of Transportation (Medical):  Not on file    Lack of Transportation (Non-Medical): Not on file   Physical Activity:     Days of Exercise per Week: Not on file    Minutes of Exercise per Session: Not on file   Stress:     Feeling of Stress : Not on file   Social Connections:     Frequency of Communication with Friends and Family: Not on file    Frequency of Social Gatherings with Friends and Family: Not on file    Attends Religion Services: Not on file    Active Member of [address] [place] [address] or Organizations: Not on file    Attends Club or Organization Meetings: Not on file    Marital Status: Not on file   Intimate Partner Violence:     Fear of Current or Ex-Partner: Not on file    Emotionally Abused: Not on file    Physically Abused: Not on file    Sexually Abused: Not on file   Housing Stability:     Unable to Pay for Housing in the Last Year: Not on file    Number of [place] in the Last Year: Not on file    Unstable Housing in the Last Year: Not on file     Family History   Problem Relation Age of Onset    High Blood Pressure Mother     Diabetes Mother         type 2    Diabetes Father         type 1    Heart Disease Father     High Blood Pressure Brother      Allergies   Allergen Reactions    Latex Itching    Pcn [Penicillins] Anaphylaxis     Stops breathing    Shellfish-Derived Products Itching         Review of Systems   Constitutional: Negative for appetite change, chills, fatigue, fever and unexpected weight change. HENT: Negative for nosebleeds, tinnitus, trouble swallowing and voice change. Eyes: Negative for photophobia, pain and redness. Respiratory: Negative for chest tightness, shortness of breath and wheezing. Cardiovascular: Negative for chest pain, palpitations and leg swelling. Gastrointestinal: Negative for abdominal distention, abdominal pain, blood in stool, constipation, diarrhea, nausea, rectal pain and vomiting. Endocrine: Negative for polydipsia, polyphagia and polyuria.    Genitourinary: Negative for difficulty urinating and hematuria. Skin: Negative for color change, pallor and rash. Neurological: Negative for dizziness, speech difficulty and headaches. Psychiatric/Behavioral: Negative for confusion and suicidal ideas. Objective:   /86 (Site: Right Upper Arm, Position: Sitting, Cuff Size: Large Adult)   Pulse 90   Resp 12   Wt (!) 315 lb (142.9 kg)   SpO2 98%   BMI 48.61 kg/m²     Physical Exam    Laboratory, Pathology, Radiology reviewed in detail with relevantimportant investigations summarized below:  Lab Results   Component Value Date    WBC 5.4 05/05/2022    WBC 8.1 07/06/2019    HGB 14.7 05/05/2022    HGB 15.2 07/06/2019    HCT 45.2 05/05/2022    HCT 46.5 07/06/2019    MCV 84.0 05/05/2022    MCV 79.2 07/06/2019     05/05/2022     07/06/2019    . Lab Results   Component Value Date    ALT 39 05/05/2022    ALT 39 07/06/2019    AST 38 05/05/2022    AST 36 07/06/2019    ALKPHOS 90 05/05/2022    ALKPHOS 116 07/06/2019    BILITOT 0.5 05/05/2022    BILITOT 0.4 07/06/2019       No results found. No results found for: IRON, TIBC, FERRITIN  No results found for: INR  No components found for: ACUTEHEPATITISSCREEN  No components found for: CELIACPANEL  No components found for: STOOLCULTURE, C.DIFF, STOOLOVAPARASITE, STOOLLEUCOCYTE        Assessment:       Diagnosis Orders   1. Fatty liver  US ABDOMEN LIMITED    US FIBROSCAN    Hepatitis B Core Antibody, Total    Hepatitis A Antibody, IgM    Hepatitis B Surface Antigen    Alpha-1-Antitrypsin w Phenotype    SIDNEY    Liver-Kidney Microsome (LKM-1) Ab (IgG)    Anti-smooth muscle antibody    Ferritin    Iron and TIBC    Immunoglobulins, Quantitative    Ceruloplasmin    Celiac Disease Panel   2. Abnormal LFTs  Celiac Disease Panel         Plan:      Orders Placed This Encounter   Procedures    US ABDOMEN LIMITED     This procedure can be scheduled via [facility].   Access your [facility] account by visiting [email or web address]. Standing Status:   Future     Standing Expiration Date:   5/23/2023    US FIBROSCAN     This procedure can be scheduled via [facility]. Access your [facility] account by visiting [email or web address]. Standing Status:   Future     Standing Expiration Date:   5/23/2023    Hepatitis B Core Antibody, Total     Standing Status:   Future     Standing Expiration Date:   5/23/2023    Hepatitis A Antibody, IgM     Standing Status:   Future     Standing Expiration Date:   5/23/2023    Hepatitis B Surface Antigen     Standing Status:   Future     Standing Expiration Date:   11/23/2022    Alpha-1-Antitrypsin w Phenotype     Standing Status:   Future     Standing Expiration Date:   5/23/2023    SIDNEY     Standing Status:   Future     Standing Expiration Date:   5/23/2023    Liver-Kidney Microsome (LKM-1) Ab (IgG)     Standing Status:   Future     Standing Expiration Date:   11/23/2022    Anti-smooth muscle antibody     Standing Status:   Future     Standing Expiration Date:   5/23/2023    Ferritin     Standing Status:   Future     Standing Expiration Date:   5/23/2023    Iron and TIBC     Standing Status:   Future     Standing Expiration Date:   5/23/2023     Order Specific Question:   Is Patient Fasting? Answer:   no     Order Specific Question:   No of Hours? Answer:   no    Immunoglobulins, Quantitative     Standing Status:   Future     Standing Expiration Date:   5/23/2023    Ceruloplasmin     Standing Status:   Future     Standing Expiration Date:   5/23/2023    Celiac Disease Panel     Standing Status:   Future     Standing Expiration Date:   5/23/2023     No orders of the defined types were placed in this encounter. Assessment and plan   1. Fatty Liver / presumed GUO:  Of note,  transaminases 2 to 3 X normal range ( Normal range for women ~19).  No etoh use reported  Pursue with etiology work up to assess for any associated viral, autoimmune or Other metabolic etiologies   Obtain liver ultrasound  2- Chronic liver disease staging:  No clinical or lab data suggestive of advanced liver disease   Obtain fibroscan for fibrosis staging and assessment  3-Presumed Geovanny Sanders in the context of metabolic syndrome  Discussed with the patient natural history of fatty liver/Santacruz, therapeutic modalities and weight loss program.  Mentioned that as low as 3.5% of weight loss, will be associated with improvement of steatosis. 5%  with inflammation improvement,  7.5% weight loss will be associated with regression of fibrosis. Continue control of associated medical conditions in term of hypertension, dyslipidemia . continue statins. Patient that she already saw dietitian. 4-Associated medical conditions include but not limited to hypertension, class III obesity with BMI of 48, endometriosis, history of cervical CA, anxiety      Return in about 6 weeks (around 7/4/2022) for Post procedure results discussion, further management.       Gennaro Vargas MD

## 2022-05-26 ASSESSMENT — ENCOUNTER SYMPTOMS
BLOOD IN STOOL: 0
VOMITING: 0
NAUSEA: 0
APNEA: 0
CONSTIPATION: 0
CHEST TIGHTNESS: 0
DIARRHEA: 0
ABDOMINAL PAIN: 0
SHORTNESS OF BREATH: 0
COUGH: 0

## 2022-06-02 ENCOUNTER — HOSPITAL ENCOUNTER (OUTPATIENT)
Dept: LAB | Age: 57
Discharge: HOME | End: 2022-06-02
Payer: COMMERCIAL

## 2022-06-02 ENCOUNTER — HOSPITAL ENCOUNTER (OUTPATIENT)
Dept: ULTRASOUND IMAGING | Age: 57
Discharge: HOME | End: 2022-06-04
Payer: COMMERCIAL

## 2022-06-02 DIAGNOSIS — R79.89 ABNORMAL LFTS: ICD-10-CM

## 2022-06-02 DIAGNOSIS — K76.0 FATTY LIVER: ICD-10-CM

## 2022-06-02 LAB — HEPATITIS B SURFACE ANTIGEN INTERPRETATION: NORMAL

## 2022-06-02 PROCEDURE — 36415 COLL VENOUS BLD VENIPUNCTURE: CPT

## 2022-06-02 PROCEDURE — 82784 ASSAY IGA/IGD/IGG/IGM EACH: CPT

## 2022-06-02 PROCEDURE — 83540 ASSAY OF IRON: CPT

## 2022-06-02 PROCEDURE — 83550 IRON BINDING TEST: CPT

## 2022-06-02 PROCEDURE — 83516 IMMUNOASSAY NONANTIBODY: CPT

## 2022-06-02 PROCEDURE — 86709 HEPATITIS A IGM ANTIBODY: CPT

## 2022-06-02 PROCEDURE — 82728 ASSAY OF FERRITIN: CPT

## 2022-06-02 PROCEDURE — 82390 ASSAY OF CERULOPLASMIN: CPT

## 2022-06-02 PROCEDURE — 82103 ALPHA-1-ANTITRYPSIN TOTAL: CPT

## 2022-06-02 PROCEDURE — 76705 ECHO EXAM OF ABDOMEN: CPT

## 2022-06-02 PROCEDURE — 86704 HEP B CORE ANTIBODY TOTAL: CPT

## 2022-06-02 PROCEDURE — 86376 MICROSOMAL ANTIBODY EACH: CPT

## 2022-06-02 PROCEDURE — 82104 ALPHA-1-ANTITRYPSIN PHENO: CPT

## 2022-06-02 PROCEDURE — 86039 ANTINUCLEAR ANTIBODIES (ANA): CPT

## 2022-06-02 PROCEDURE — 87340 HEPATITIS B SURFACE AG IA: CPT

## 2022-06-03 LAB
CERULOPLASMIN: 24 MG/DL (ref 16–45)
FERRITIN: 30 NG/ML (ref 13–150)
HAV IGM SER IA-ACNC: NONREACTIVE
HEPATITIS B CORE TOTAL ANTIBODY: NEGATIVE
IRON SATURATION: 16 % (ref 20–55)
IRON: 64 UG/DL (ref 37–145)
TOTAL IRON BINDING CAPACITY: 390 UG/DL (ref 250–450)
UNSATURATED IRON BINDING CAPACITY: 326 UG/DL (ref 112–347)

## 2022-06-04 LAB
IGA: 364 MG/DL (ref 68–408)
IGG: 1294 MG/DL (ref 768–1632)
IGM: 135 MG/DL (ref 35–263)

## 2022-06-06 LAB
ANA PATTERN: ABNORMAL
ANA TITER: ABNORMAL
ANTINUCLEAR AB INTERPRETIVE COMMENT: ABNORMAL
ANTINUCLEAR ANTIBODY, HEP-2, IGG: DETECTED
CELIAC PANEL: 7 UNITS (ref 0–19)
LIVER-KIDNEY MICROSOME-1 AB IGG: 3.2 U (ref 0–24.9)

## 2022-06-07 ENCOUNTER — ANCILLARY PROCEDURE (OUTPATIENT)
Dept: ENDOSCOPY | Age: 57
End: 2022-06-07
Payer: COMMERCIAL

## 2022-06-07 DIAGNOSIS — K76.0 FATTY LIVER: ICD-10-CM

## 2022-06-07 PROCEDURE — 91200 LIVER ELASTOGRAPHY: CPT | Performed by: INTERNAL MEDICINE

## 2022-06-07 PROCEDURE — 91200 LIVER ELASTOGRAPHY: CPT

## 2022-06-14 LAB — F-ACTIN AB IGA: 26.3 UNITS (ref 0–24.9)

## 2022-06-16 LAB
ALPHA-1 ANTITRYPSIN PHENOTYPE: NORMAL
ALPHA-1 ANTITRYPSIN: 126 MG/DL (ref 90–200)

## 2022-06-19 DIAGNOSIS — I10 ESSENTIAL (PRIMARY) HYPERTENSION: ICD-10-CM

## 2022-06-19 DIAGNOSIS — F41.1 GENERALIZED ANXIETY DISORDER: ICD-10-CM

## 2022-06-19 DIAGNOSIS — I10 ESSENTIAL HYPERTENSION: ICD-10-CM

## 2022-06-19 DIAGNOSIS — E78.2 MIXED HYPERLIPIDEMIA: ICD-10-CM

## 2022-06-20 ENCOUNTER — OFFICE VISIT (OUTPATIENT)
Dept: GASTROENTEROLOGY | Age: 57
End: 2022-06-20
Payer: COMMERCIAL

## 2022-06-20 VITALS
SYSTOLIC BLOOD PRESSURE: 122 MMHG | HEART RATE: 74 BPM | OXYGEN SATURATION: 98 % | BODY MASS INDEX: 49.07 KG/M2 | DIASTOLIC BLOOD PRESSURE: 80 MMHG | WEIGHT: 293 LBS

## 2022-06-20 DIAGNOSIS — R79.89 ABNORMAL LFTS: Primary | ICD-10-CM

## 2022-06-20 PROCEDURE — G8417 CALC BMI ABV UP PARAM F/U: HCPCS | Performed by: INTERNAL MEDICINE

## 2022-06-20 PROCEDURE — 3017F COLORECTAL CA SCREEN DOC REV: CPT | Performed by: INTERNAL MEDICINE

## 2022-06-20 PROCEDURE — 1036F TOBACCO NON-USER: CPT | Performed by: INTERNAL MEDICINE

## 2022-06-20 PROCEDURE — G8427 DOCREV CUR MEDS BY ELIG CLIN: HCPCS | Performed by: INTERNAL MEDICINE

## 2022-06-20 PROCEDURE — 99213 OFFICE O/P EST LOW 20 MIN: CPT | Performed by: INTERNAL MEDICINE

## 2022-06-20 RX ORDER — AMLODIPINE BESYLATE 10 MG/1
10 TABLET ORAL DAILY
Qty: 90 TABLET | Refills: 1 | Status: SHIPPED | OUTPATIENT
Start: 2022-06-20

## 2022-06-20 RX ORDER — ESCITALOPRAM OXALATE 20 MG/1
20 TABLET ORAL DAILY
Qty: 90 TABLET | Refills: 1 | Status: SHIPPED | OUTPATIENT
Start: 2022-06-20

## 2022-06-20 RX ORDER — ROSUVASTATIN CALCIUM 20 MG/1
20 TABLET, COATED ORAL DAILY
Qty: 90 TABLET | Refills: 1 | Status: SHIPPED | OUTPATIENT
Start: 2022-06-20

## 2022-06-20 RX ORDER — LOSARTAN POTASSIUM 100 MG/1
100 TABLET ORAL DAILY
Qty: 90 TABLET | Refills: 1 | Status: SHIPPED | OUTPATIENT
Start: 2022-06-20

## 2022-06-20 RX ORDER — LANOLIN ALCOHOL/MO/W.PET/CERES
400 CREAM (GRAM) TOPICAL NIGHTLY
Qty: 90 TABLET | Refills: 1 | Status: SHIPPED | OUTPATIENT
Start: 2022-06-20

## 2022-06-20 NOTE — PROGRESS NOTES
Subjective:      Patient ID: Tiffanie Smith is a 49 y.o. female who presents today for:  Chief Complaint   Patient presents with    Follow-up       HPI   Patient came in today for follow-up visit. Since last visit patient had extensive work-up that shows mild increase of SIDNEY anti-smooth antibodies. FibroScan shows stage II fibrosis. Otherwise no change of condition. Patient came in to discuss findings further plan of care  Background  This is a very pleasant 61-year-old who came in today for the evaluation management of abnormal liver enzymes. Patient mentioned that she had abnormal liver enzymes and and also she reported that she has been having insulin resistance. She reports that she tried weight loss however was not successful. Patient had persistent elevation of liver enzymes less than 2 times normal range. Does have history of hypertension, dyslipidemia and told that she had fatty liver. No recent imaging reported. No family history of liver diseases. No alcohol use. No history of jaundice. No history of viral hepatitis. Patient came in today to establish care and for further management  Past Medical History:   Diagnosis Date    Anxiety     Endometriosis     Fatty liver     History of cervical cancer     HTN (hypertension)     Seasonal allergies      No past surgical history on file.   Social History     Socioeconomic History    Marital status:      Spouse name: Not on file    Number of children: Not on file    Years of education: Not on file    Highest education level: Not on file   Occupational History    Not on file   Tobacco Use    Smoking status: Never Smoker    Smokeless tobacco: Never Used   Substance and Sexual Activity    Alcohol use: Yes     Comment: socially, rare    Drug use: Never    Sexual activity: Not on file   Other Topics Concern    Not on file   Social History Narrative    Not on file     Social Determinants of Health     Financial Resource Strain: Low Risk     Difficulty of Paying Living Expenses: Not hard at all   Food Insecurity: No Food Insecurity    Worried About Running Out of Food in the Last Year: Never true    Patrice of Food in the Last Year: Never true   Transportation Needs:     Lack of Transportation (Medical): Not on file    Lack of Transportation (Non-Medical):  Not on file   Physical Activity:     Days of Exercise per Week: Not on file    Minutes of Exercise per Session: Not on file   Stress:     Feeling of Stress : Not on file   Social Connections:     Frequency of Communication with Friends and Family: Not on file    Frequency of Social Gatherings with Friends and Family: Not on file    Attends Yarsanism Services: Not on file    Active Member of [employer] or Organizations: Not on file    Attends Club or Organization Meetings: Not on file    Marital Status: Not on file   Intimate Partner Violence:     Fear of Current or Ex-Partner: Not on file    Emotionally Abused: Not on file    Physically Abused: Not on file    Sexually Abused: Not on file   Housing Stability:     Unable to Pay for Housing in the Last Year: Not on file    Number of [place] in the Last Year: Not on file    Unstable Housing in the Last Year: Not on file     Family History   Problem Relation Age of Onset    High Blood Pressure Mother     Diabetes Mother         type 2    Diabetes Father         type 1    Heart Disease Father     High Blood Pressure Brother      Allergies   Allergen Reactions    Latex Itching    Pcn [Penicillins] Anaphylaxis     Stops breathing    Shellfish-Derived Products Itching         Review of Systems    Objective:   /80 (Site: Right Upper Arm, Position: Sitting, Cuff Size: Large Adult)   Pulse 74   Wt (!) 318 lb (144.2 kg)   SpO2 98%   BMI 49.07 kg/m²     Physical Exam    Laboratory, Pathology, Radiology reviewed in detail with relevantimportant investigations summarized below:  Lab Results   Component Value Date WBC 5.4 2022    WBC 8.1 2019    HGB 14.7 2022    HGB 15.2 2019    HCT 45.2 2022    HCT 46.5 2019    MCV 84.0 2022    MCV 79.2 2019     2022     2019    . Lab Results   Component Value Date    ALT 39 2022    ALT 39 2019    AST 38 2022    AST 36 2019    ALKPHOS 90 2022    ALKPHOS 116 2019    BILITOT 0.5 2022    BILITOT 0.4 2019       US ABDOMEN LIMITED    Result Date: 2022  INDICATION: Elevated liver function tests. COMPARISON: None. . TECHNIQUE: Targeted right upper quadrant ultrasound was performed. Findings: Pancreatic head and body are sonographically normal in appearance. Overlying bowel gas limits evaluation of the pancreatic tail. The liver demonstrates slightly increased echogenicity. No definite focal hepatic mass is identified. The common bile duct measures 0.3 cm in caliber. The gallbladder is normal in appearance. There is no stone, gallbladder wall thickening, or pericholecystic fluid. Sonographic Timoteo Hail sign was not present at the time of examination. Limited evaluation of the right kidney demonstrates no hydronephrosis. There is no free fluid within the right upper quadrant of the abdomen. Hepatic steatosis. US FIBROSCAN    Result Date: 2022  Velocity Controlled Transient Elastography (Fibroscan)  : Talon Bailey Attending:  Caleb Gonsalez MD   Patient was identified via name and . Randy Reno presents to endoscopy suite for VCTE.   Referring Physician:  Dr. Monico Goldmann Diagnosis:  Fatty Liver   Probe Used:  XL   Pre-procedure Checklist:   Presence of ascites:  No Fasting for at least two hours: Yes Alcohol Use:  No History of heart failure:   No     Findings:   Median kPa:  8.9 IQR/med (%):  25   Controlled Attenuation Paramater (CAP) Median (dB/m):  309 IQR (%):  79   Interpretation: Borderline reading, Based on LSM of 8.9 Kpa,  Fibrosis stage II ( F2) physician    Return in about 3 months (around 9/20/2022) for further management.       Live Bain MD

## 2022-06-20 NOTE — TELEPHONE ENCOUNTER
Pharmacy is requesting medication refill.  Please approve or deny this request.    Rx requested:  Requested Prescriptions     Pending Prescriptions Disp Refills    hydroCHLOROthiazide (MICROZIDE) 12.5 MG capsule [Pharmacy Med Name: hydrochlorothiazide 12.5 mg capsule] 90 capsule 1     Sig: Take 1 capsule by mouth daily    magnesium oxide (MAG-OX) 400 (240 Mg) MG tablet [Pharmacy Med Name: magnesium oxide 400 mg (241.3 mg magnesium) tablet] 90 tablet 1     Sig: Take 1 tablet by mouth at bedtime    rosuvastatin (CRESTOR) 20 MG tablet [Pharmacy Med Name: rosuvastatin 20 mg tablet] 90 tablet 1     Sig: Take 1 tablet by mouth daily    amLODIPine (NORVASC) 10 MG tablet [Pharmacy Med Name: amlodipine 10 mg tablet] 90 tablet 1     Sig: Take 1 tablet by mouth daily    losartan (COZAAR) 100 MG tablet [Pharmacy Med Name: losartan 100 mg tablet] 90 tablet 1     Sig: Take 1 tablet by mouth daily    escitalopram (LEXAPRO) 20 MG tablet [Pharmacy Med Name: escitalopram 20 mg tablet] 90 tablet 1     Sig: Take 1 tablet by mouth daily         Last Office Visit:   5/17/2022      Next Visit Date:  Future Appointments   Date Time Provider Cora Hsieh   10/19/2022  9:00 AM MD Kosta Segura [facility] [address]   10/24/2022 10:45 AM Jhony Mckinney MD [facility]

## 2022-06-21 RX ORDER — HYDROCHLOROTHIAZIDE 12.5 MG/1
12.5 CAPSULE, GELATIN COATED ORAL DAILY
Qty: 90 CAPSULE | Refills: 1 | Status: SHIPPED | OUTPATIENT
Start: 2022-06-21

## 2022-11-07 DIAGNOSIS — R73.03 PREDIABETES: ICD-10-CM

## 2022-11-07 DIAGNOSIS — R79.89 ELEVATED LFTS: ICD-10-CM

## 2022-11-07 DIAGNOSIS — I10 ESSENTIAL HYPERTENSION: Primary | ICD-10-CM

## 2022-12-09 DIAGNOSIS — F41.1 GENERALIZED ANXIETY DISORDER: ICD-10-CM

## 2022-12-12 NOTE — TELEPHONE ENCOUNTER
Pharmacy is requesting medication refill.  Please approve or deny this request.    Rx requested:  Requested Prescriptions     Pending Prescriptions Disp Refills    escitalopram (LEXAPRO) 20 MG tablet [Pharmacy Med Name: escitalopram 20 mg tablet] 90 tablet 1     Sig: Take 1 tablet by mouth daily         Last Office Visit:   5/17/2022      Next Visit Date:  Future Appointments   Date Time Provider Cora Hsieh   12/16/2022 10:30 AM MD Kosta [address]

## 2022-12-13 ENCOUNTER — HOSPITAL ENCOUNTER (OUTPATIENT)
Dept: LAB | Age: 57
Discharge: HOME | End: 2022-12-13
Payer: COMMERCIAL

## 2022-12-13 DIAGNOSIS — I10 ESSENTIAL HYPERTENSION: ICD-10-CM

## 2022-12-13 DIAGNOSIS — R79.89 ELEVATED LFTS: ICD-10-CM

## 2022-12-13 DIAGNOSIS — R73.03 PREDIABETES: ICD-10-CM

## 2022-12-13 LAB
ALBUMIN SERPL-MCNC: 4.2 G/DL (ref 3.5–4.6)
ALP BLD-CCNC: 93 U/L (ref 40–130)
ALT SERPL-CCNC: 57 U/L (ref 0–33)
ANION GAP SERPL CALCULATED.3IONS-SCNC: 14 MEQ/L (ref 9–15)
AST SERPL-CCNC: 47 U/L (ref 0–35)
BASOPHILS ABSOLUTE: 0 K/UL (ref 0–0.2)
BASOPHILS RELATIVE PERCENT: 0.7 %
BILIRUB SERPL-MCNC: 0.6 MG/DL (ref 0.2–0.7)
BUN BLDV-MCNC: 13 MG/DL (ref 6–20)
CALCIUM SERPL-MCNC: 9.1 MG/DL (ref 8.5–9.9)
CHLORIDE BLD-SCNC: 98 MEQ/L (ref 95–107)
CO2: 26 MEQ/L (ref 20–31)
CREAT SERPL-MCNC: 0.71 MG/DL (ref 0.5–0.9)
EOSINOPHILS ABSOLUTE: 0.2 K/UL (ref 0–0.7)
EOSINOPHILS RELATIVE PERCENT: 3 %
GFR SERPL CREATININE-BSD FRML MDRD: >60 ML/MIN/{1.73_M2}
GLOBULIN: 3.7 G/DL (ref 2.3–3.5)
GLUCOSE BLD-MCNC: 103 MG/DL (ref 70–99)
HBA1C MFR BLD: 6.2 % (ref 4.8–5.9)
HCT VFR BLD CALC: 44.8 % (ref 37–47)
HEMOGLOBIN: 14.8 G/DL (ref 12–16)
LYMPHOCYTES ABSOLUTE: 1.2 K/UL (ref 1–4.8)
LYMPHOCYTES RELATIVE PERCENT: 21 %
MCH RBC QN AUTO: 27.4 PG (ref 27–31.3)
MCHC RBC AUTO-ENTMCNC: 33 % (ref 33–37)
MCV RBC AUTO: 83 FL (ref 79.4–94.8)
MONOCYTES ABSOLUTE: 0.5 K/UL (ref 0.2–0.8)
MONOCYTES RELATIVE PERCENT: 8.7 %
NEUTROPHILS ABSOLUTE: 3.9 K/UL (ref 1.4–6.5)
NEUTROPHILS RELATIVE PERCENT: 66.6 %
PDW BLD-RTO: 14.2 % (ref 11.5–14.5)
PLATELET # BLD: 208 K/UL (ref 130–400)
POTASSIUM SERPL-SCNC: 3.9 MEQ/L (ref 3.4–4.9)
RBC # BLD: 5.39 M/UL (ref 4.2–5.4)
SODIUM BLD-SCNC: 138 MEQ/L (ref 135–144)
TOTAL PROTEIN: 7.9 G/DL (ref 6.3–8)
WBC # BLD: 5.9 K/UL (ref 4.8–10.8)

## 2022-12-13 PROCEDURE — 85025 COMPLETE CBC W/AUTO DIFF WBC: CPT

## 2022-12-13 PROCEDURE — 36415 COLL VENOUS BLD VENIPUNCTURE: CPT

## 2022-12-13 PROCEDURE — 80053 COMPREHEN METABOLIC PANEL: CPT

## 2022-12-13 PROCEDURE — 83036 HEMOGLOBIN GLYCOSYLATED A1C: CPT

## 2022-12-13 RX ORDER — ESCITALOPRAM OXALATE 20 MG/1
20 TABLET ORAL DAILY
Qty: 90 TABLET | Refills: 1 | Status: SHIPPED | OUTPATIENT
Start: 2022-12-13

## 2022-12-16 ENCOUNTER — OFFICE VISIT (OUTPATIENT)
Dept: FAMILY MEDICINE CLINIC | Age: 57
End: 2022-12-16
Payer: COMMERCIAL

## 2022-12-16 VITALS
OXYGEN SATURATION: 99 % | DIASTOLIC BLOOD PRESSURE: 84 MMHG | BODY MASS INDEX: 51.45 KG/M2 | HEART RATE: 88 BPM | SYSTOLIC BLOOD PRESSURE: 140 MMHG | TEMPERATURE: 98.6 F | WEIGHT: 293 LBS

## 2022-12-16 DIAGNOSIS — R73.03 PREDIABETES: ICD-10-CM

## 2022-12-16 DIAGNOSIS — Z00.00 ANNUAL PHYSICAL EXAM: Primary | ICD-10-CM

## 2022-12-16 DIAGNOSIS — E66.01 CLASS 3 SEVERE OBESITY WITH SERIOUS COMORBIDITY AND BODY MASS INDEX (BMI) OF 50.0 TO 59.9 IN ADULT, UNSPECIFIED OBESITY TYPE (HCC): ICD-10-CM

## 2022-12-16 DIAGNOSIS — K76.0 FATTY LIVER: ICD-10-CM

## 2022-12-16 PROCEDURE — 3079F DIAST BP 80-89 MM HG: CPT | Performed by: FAMILY MEDICINE

## 2022-12-16 PROCEDURE — G8484 FLU IMMUNIZE NO ADMIN: HCPCS | Performed by: FAMILY MEDICINE

## 2022-12-16 PROCEDURE — 99396 PREV VISIT EST AGE 40-64: CPT | Performed by: FAMILY MEDICINE

## 2022-12-16 PROCEDURE — 3074F SYST BP LT 130 MM HG: CPT | Performed by: FAMILY MEDICINE

## 2022-12-16 ASSESSMENT — ENCOUNTER SYMPTOMS
RHINORRHEA: 0
SHORTNESS OF BREATH: 0
CHOKING: 0
COLOR CHANGE: 0
ABDOMINAL DISTENTION: 0
BACK PAIN: 0
COUGH: 0
BLOOD IN STOOL: 0
EYE REDNESS: 0
PHOTOPHOBIA: 0
APNEA: 0
SINUS PRESSURE: 0
EYE DISCHARGE: 0
CHEST TIGHTNESS: 0
DIARRHEA: 0
EYE ITCHING: 0
ABDOMINAL PAIN: 0
TROUBLE SWALLOWING: 0
EYE PAIN: 0
WHEEZING: 0
ANAL BLEEDING: 0
CONSTIPATION: 0
NAUSEA: 0
FACIAL SWELLING: 0

## 2022-12-21 ENCOUNTER — OFFICE VISIT (OUTPATIENT)
Dept: ENDOCRINOLOGY | Age: 57
End: 2022-12-21
Payer: COMMERCIAL

## 2022-12-21 VITALS
SYSTOLIC BLOOD PRESSURE: 158 MMHG | HEART RATE: 72 BPM | BODY MASS INDEX: 44.41 KG/M2 | OXYGEN SATURATION: 94 % | HEIGHT: 68 IN | WEIGHT: 293 LBS | DIASTOLIC BLOOD PRESSURE: 94 MMHG

## 2022-12-21 DIAGNOSIS — E28.2 PCOS (POLYCYSTIC OVARIAN SYNDROME): ICD-10-CM

## 2022-12-21 DIAGNOSIS — R73.03 PRE-DIABETES: ICD-10-CM

## 2022-12-21 DIAGNOSIS — E66.01 CLASS 3 SEVERE OBESITY WITH SERIOUS COMORBIDITY AND BODY MASS INDEX (BMI) OF 50.0 TO 59.9 IN ADULT, UNSPECIFIED OBESITY TYPE (HCC): Primary | ICD-10-CM

## 2022-12-21 PROCEDURE — G8484 FLU IMMUNIZE NO ADMIN: HCPCS | Performed by: PHYSICIAN ASSISTANT

## 2022-12-21 PROCEDURE — 99203 OFFICE O/P NEW LOW 30 MIN: CPT | Performed by: PHYSICIAN ASSISTANT

## 2022-12-21 PROCEDURE — 3074F SYST BP LT 130 MM HG: CPT | Performed by: PHYSICIAN ASSISTANT

## 2022-12-21 PROCEDURE — G8417 CALC BMI ABV UP PARAM F/U: HCPCS | Performed by: PHYSICIAN ASSISTANT

## 2022-12-21 PROCEDURE — 82962 GLUCOSE BLOOD TEST: CPT | Performed by: PHYSICIAN ASSISTANT

## 2022-12-21 PROCEDURE — 3079F DIAST BP 80-89 MM HG: CPT | Performed by: PHYSICIAN ASSISTANT

## 2022-12-21 PROCEDURE — G8427 DOCREV CUR MEDS BY ELIG CLIN: HCPCS | Performed by: PHYSICIAN ASSISTANT

## 2022-12-21 RX ORDER — LIRAGLUTIDE 6 MG/ML
INJECTION, SOLUTION SUBCUTANEOUS
Qty: 5 ADJUSTABLE DOSE PRE-FILLED PEN SYRINGE | Refills: 3 | Status: CANCELLED | OUTPATIENT
Start: 2022-12-21

## 2022-12-21 RX ORDER — GLUCOSAMINE HCL/CHONDROITIN SU 500-400 MG
1 CAPSULE ORAL 2 TIMES DAILY
Qty: 100 STRIP | Refills: 3 | Status: SHIPPED | OUTPATIENT
Start: 2022-12-21

## 2022-12-21 RX ORDER — LANCETS 30 GAUGE
1 EACH MISCELLANEOUS 2 TIMES DAILY
Qty: 100 EACH | Refills: 3 | Status: SHIPPED | OUTPATIENT
Start: 2022-12-21

## 2022-12-21 RX ORDER — SEMAGLUTIDE 1.34 MG/ML
0.25 INJECTION, SOLUTION SUBCUTANEOUS WEEKLY
Qty: 4 ADJUSTABLE DOSE PRE-FILLED PEN SYRINGE | Refills: 1 | Status: SHIPPED | OUTPATIENT
Start: 2022-12-21

## 2022-12-21 ASSESSMENT — ENCOUNTER SYMPTOMS
RHINORRHEA: 0
SINUS PRESSURE: 0
SHORTNESS OF BREATH: 0
DIARRHEA: 0
WHEEZING: 0

## 2022-12-21 NOTE — Clinical Note
Aime Segura-I am going to try to get her on a GLP-1 RA for weight loss and A1c control.  Riccardo Pelayo

## 2022-12-21 NOTE — PATIENT INSTRUCTIONS
Start-  Inject Ozempic 0.25 mg once a week for 4 weeks. Increase Ozempic 0.5 mg injected weekly after 1 month if no nausea or vomiting.    Glucometer ordered  Repeat labs and follow up in 3 months

## 2022-12-21 NOTE — PROGRESS NOTES
2022    Assessment:       Diagnosis Orders   1. Class 3 severe obesity with serious comorbidity and body mass index (BMI) of 50.0 to 59.9 in adult, unspecified obesity type (HCC)  Comprehensive Metabolic Panel    Hemoglobin A1C    POCT Glucose    Insulin Free & Total      2. Pre-diabetes  Comprehensive Metabolic Panel    Hemoglobin A1C    POCT Glucose    Insulin Free & Total      3. PCOS (polycystic ovarian syndrome)  Comprehensive Metabolic Panel    Hemoglobin A1C    POCT Glucose    Insulin Free & Total          PLAN:     Start-  Inject Ozempic 0.25 mg once a week for 4 weeks. Increase Ozempic 0.5 mg injected weekly after 1 month if no nausea or vomiting. Glucometer ordered  Repeat labs and follow up in 3 months       Orders Placed This Encounter   Procedures    Comprehensive Metabolic Panel     Standing Status:   Future     Standing Expiration Date:   2023    Hemoglobin A1C     Standing Status:   Future     Standing Expiration Date:   2023    Insulin Free & Total     Standing Status:   Future     Standing Expiration Date:   2023    POCT Glucose       Orders Placed This Encounter   Medications    Semaglutide,0.25 or 0.5MG/DOS, (OZEMPIC, 0.25 OR 0.5 MG/DOSE,) 2 MG/1.5ML SOPN     Sig: Inject 0.25 mg into the skin once a week Inject 0.25 mg weekly for 4 weeks, then increase to 0.5 mg     Dispense:  4 Adjustable Dose Pre-filled Pen Syringe     Refill:  1    blood glucose monitor kit and supplies     Si kit by Other route 2 times daily Pt test 4x daily Dx E11.65. May substitute for generic or insurance covered product     Dispense:  1 kit     Refill:  0    blood glucose monitor strips     Si strip by Other route 2 times daily Pt test 4x daily Dx E11.65. May substitute for generic or insurance covered product     Dispense:  100 strip     Refill:  3    Lancets MISC     Si each by Does not apply route 2 times daily Pt test 4x daily Dx E11.65.   May substitute for generic or insurance covered product     Dispense:  100 each     Refill:  3       Return in about 3 months (around 3/21/2023) for Diabetes, weight management. Subjective:     Chief Complaint   Patient presents with    New Patient    Diabetes    Obesity     Vitals:    12/21/22 1113 12/21/22 1127   BP: (!) 176/114 (!) 158/94   Site: Left Upper Arm    Pulse: 72    SpO2: 94%    Weight: (!) 333 lb (151 kg)    Height: 5' 7.5\" (1.715 m)      Wt Readings from Last 3 Encounters:   12/21/22 (!) 333 lb (151 kg)   12/16/22 (!) 333 lb 6.4 oz (151.2 kg)   06/20/22 (!) 318 lb (144.2 kg)     BP Readings from Last 3 Encounters:   12/21/22 (!) 158/94   12/16/22 (!) 140/84   06/20/22 122/80     Patient is a 61-year-old obese female presents today for prediabetes and weight management. She has a history of PCOS with previous irregular menses, acanthosis, and insulin resistance. She is tried multiple diets she is very consistent with them eats healthy usually 90% of the time and is relatively active. Despite these lifestyle changes she is having difficulty losing weight. She is also prediabetic for the last year. A1c has increased from 5.9% to 6.2%. Going to start her on a GLP-1 for improvement of A1c as well as appetite suppression to meet her fitness goals and facilitate weight loss to avoid potential complications in the future. Past Medical History:   Diagnosis Date    Anxiety     Endometriosis     Fatty liver     History of cervical cancer     HTN (hypertension)     Seasonal allergies      No past surgical history on file.   Social History     Socioeconomic History    Marital status:      Spouse name: Not on file    Number of children: Not on file    Years of education: Not on file    Highest education level: Not on file   Occupational History    Not on file   Tobacco Use    Smoking status: Never    Smokeless tobacco: Never   Substance and Sexual Activity    Alcohol use: Yes     Comment: socially, rare    Drug use: Never Sexual activity: Not on file   Other Topics Concern    Not on file   Social History Narrative    Not on file     Social Determinants of Health     Financial Resource Strain: Low Risk     Difficulty of Paying Living Expenses: Not hard at all   Food Insecurity: No Food Insecurity    Worried About Running Out of Food in the Last Year: Never true    Ran Out of Food in the Last Year: Never true   Transportation Needs: Not on file   Physical Activity: Not on file   Stress: Not on file   Social Connections: Not on file   Intimate Partner Violence: Not on file   Housing Stability: Not on file     Family History   Problem Relation Age of Onset    High Blood Pressure Mother     Diabetes Mother         type 2    Diabetes Father         type 1    Heart Disease Father     High Blood Pressure Brother      Allergies   Allergen Reactions    Latex Itching    Iodine Shortness Of Breath    Pcn [Penicillins] Anaphylaxis     Stops breathing    Shellfish-Derived Products Itching       Current Outpatient Medications:     Semaglutide,0.25 or 0.5MG/DOS, (OZEMPIC, 0.25 OR 0.5 MG/DOSE,) 2 MG/1.5ML SOPN, Inject 0.25 mg into the skin once a week Inject 0.25 mg weekly for 4 weeks, then increase to 0.5 mg, Disp: 4 Adjustable Dose Pre-filled Pen Syringe, Rfl: 1    blood glucose monitor kit and supplies, 1 kit by Other route 2 times daily Pt test 4x daily Dx E11.65. May substitute for generic or insurance covered product, Disp: 1 kit, Rfl: 0    blood glucose monitor strips, 1 strip by Other route 2 times daily Pt test 4x daily Dx E11.65. May substitute for generic or insurance covered product, Disp: 100 strip, Rfl: 3    Lancets MISC, 1 each by Does not apply route 2 times daily Pt test 4x daily Dx E11.65.   May substitute for generic or insurance covered product, Disp: 100 each, Rfl: 3    escitalopram (LEXAPRO) 20 MG tablet, Take 1 tablet by mouth daily, Disp: 90 tablet, Rfl: 1    hydroCHLOROthiazide (MICROZIDE) 12.5 MG capsule, Take 1 capsule by mouth daily, Disp: 90 capsule, Rfl: 1    magnesium oxide (MAG-OX) 400 (240 Mg) MG tablet, Take 1 tablet by mouth at bedtime, Disp: 90 tablet, Rfl: 1    rosuvastatin (CRESTOR) 20 MG tablet, Take 1 tablet by mouth daily, Disp: 90 tablet, Rfl: 1    amLODIPine (NORVASC) 10 MG tablet, Take 1 tablet by mouth daily, Disp: 90 tablet, Rfl: 1    losartan (COZAAR) 100 MG tablet, Take 1 tablet by mouth daily, Disp: 90 tablet, Rfl: 1  Lab Results   Component Value Date     12/13/2022    K 3.9 12/13/2022    CL 98 12/13/2022    CO2 26 12/13/2022    BUN 13 12/13/2022    CREATININE 0.71 12/13/2022    GLUCOSE 103 (H) 12/13/2022    CALCIUM 9.1 12/13/2022    PROT 7.9 12/13/2022    LABALBU 4.2 12/13/2022    BILITOT 0.6 12/13/2022    ALKPHOS 93 12/13/2022    AST 47 (H) 12/13/2022    ALT 57 (H) 12/13/2022    LABGLOM >60.0 12/13/2022    GFRAA >60.0 05/05/2022    GLOB 3.7 (H) 12/13/2022     Lab Results   Component Value Date    WBC 5.9 12/13/2022    HGB 14.8 12/13/2022    HCT 44.8 12/13/2022    MCV 83.0 12/13/2022     12/13/2022     Lab Results   Component Value Date    LABA1C 6.2 (H) 12/13/2022    LABA1C 5.9 05/05/2022       Lab Results   Component Value Date    CHOLFAST 160 05/05/2022    TRIGLYCFAST 117 05/05/2022    HDL 37 (L) 05/05/2022    LDLCALC 100 05/05/2022     No results found for: TESTOSTERONE, SHBG, TESTFREENM  No results found for: TSH, TSHREFLEX, FT3, T4FREE  No results found for: TPOABS    Review of Systems   HENT:  Negative for ear pain, postnasal drip, rhinorrhea and sinus pressure. Eyes:  Negative for visual disturbance. Respiratory:  Negative for shortness of breath and wheezing. Cardiovascular:  Negative for palpitations and leg swelling. Gastrointestinal:  Negative for diarrhea. Endocrine: Negative for cold intolerance, heat intolerance, polydipsia and polyuria.         Patient denies history of pancreatitis, alcohol abuse, family or personal history of thyroid cancer, MEN 2, MTC. Genitourinary:  Negative for difficulty urinating. Allergic/Immunologic: Negative for environmental allergies. Neurological:  Negative for dizziness. Hematological:  Does not bruise/bleed easily. Psychiatric/Behavioral:  Negative for dysphoric mood. Objective:   Physical Exam  Vitals reviewed. Constitutional:       Appearance: Normal appearance. She is well-developed. She is not ill-appearing. HENT:      Head: Normocephalic and atraumatic. Nose: No congestion. Eyes:      Conjunctiva/sclera: Conjunctivae normal.   Cardiovascular:      Rate and Rhythm: Normal rate and regular rhythm. Heart sounds: Normal heart sounds. Pulmonary:      Effort: Pulmonary effort is normal.      Breath sounds: Normal breath sounds. Abdominal:      General: Bowel sounds are normal.      Palpations: Abdomen is soft. Musculoskeletal:         General: Normal range of motion. Cervical back: Normal range of motion and neck supple. Skin:     General: Skin is warm and dry. Neurological:      General: No focal deficit present. Mental Status: She is alert and oriented to person, place, and time.    Psychiatric:         Mood and Affect: Mood normal.

## 2022-12-27 ENCOUNTER — TELEPHONE (OUTPATIENT)
Dept: ENDOCRINOLOGY | Age: 57
End: 2022-12-27

## 2023-01-25 DIAGNOSIS — E78.2 MIXED HYPERLIPIDEMIA: ICD-10-CM

## 2023-01-25 DIAGNOSIS — I10 ESSENTIAL HYPERTENSION: ICD-10-CM

## 2023-01-25 DIAGNOSIS — F41.1 GENERALIZED ANXIETY DISORDER: ICD-10-CM

## 2023-01-25 DIAGNOSIS — I10 ESSENTIAL (PRIMARY) HYPERTENSION: ICD-10-CM

## 2023-01-25 RX ORDER — HYDROCHLOROTHIAZIDE 12.5 MG/1
12.5 CAPSULE, GELATIN COATED ORAL DAILY
Qty: 90 CAPSULE | Refills: 1 | Status: SHIPPED | OUTPATIENT
Start: 2023-01-25

## 2023-01-25 RX ORDER — LOSARTAN POTASSIUM 100 MG/1
100 TABLET ORAL DAILY
Qty: 90 TABLET | Refills: 1 | Status: SHIPPED | OUTPATIENT
Start: 2023-01-25

## 2023-01-25 RX ORDER — ESCITALOPRAM OXALATE 20 MG/1
20 TABLET ORAL DAILY
Qty: 90 TABLET | Refills: 1 | Status: SHIPPED | OUTPATIENT
Start: 2023-01-25

## 2023-01-25 RX ORDER — AMLODIPINE BESYLATE 10 MG/1
10 TABLET ORAL DAILY
Qty: 90 TABLET | Refills: 1 | Status: SHIPPED | OUTPATIENT
Start: 2023-01-25

## 2023-01-25 NOTE — TELEPHONE ENCOUNTER
Comments: LVM for pt to call office. Fax received from [facility]. Is pt using [facility] or [facility]? Last Office Visit (last PCP visit):   12/16/2022    Next Visit Date:  Future Appointments   Date Time Provider Cora Мария   3/17/2023 10:45 AM Roger Melvin, [address]       **If hasn't been seen in over a year OR hasn't followed up according to last diabetes/ADHD visit, make appointment for patient before sending refill to provider.     Rx requested:  Requested Prescriptions     Pending Prescriptions Disp Refills    escitalopram (LEXAPRO) 20 MG tablet 90 tablet 1     Sig: Take 1 tablet by mouth daily    hydroCHLOROthiazide (MICROZIDE) 12.5 MG capsule 90 capsule 1     Sig: Take 1 capsule by mouth daily    amLODIPine (NORVASC) 10 MG tablet 90 tablet 1     Sig: Take 1 tablet by mouth daily    losartan (COZAAR) 100 MG tablet 90 tablet 1     Sig: Take 1 tablet by mouth daily

## 2023-01-25 NOTE — TELEPHONE ENCOUNTER
Mariajose Rodriguez returning call re scripts - yes she is using [facility].

## 2023-01-25 NOTE — TELEPHONE ENCOUNTER
Comments:     Last Office Visit (last PCP visit):   12/16/2022    Next Visit Date:  Future Appointments   Date Time Provider Cora Hsieh   3/17/2023 10:45 AM Nathalie Gay, [address]       **If hasn't been seen in over a year OR hasn't followed up according to last diabetes/ADHD visit, make appointment for patient before sending refill to provider.     Rx requested:  Requested Prescriptions     Pending Prescriptions Disp Refills    rosuvastatin (CRESTOR) 20 MG tablet 90 tablet 1     Sig: Take 1 tablet by mouth daily

## 2023-01-26 RX ORDER — ROSUVASTATIN CALCIUM 20 MG/1
20 TABLET, COATED ORAL DAILY
Qty: 90 TABLET | Refills: 1 | Status: SHIPPED | OUTPATIENT
Start: 2023-01-26

## 2023-01-31 RX ORDER — LANOLIN ALCOHOL/MO/W.PET/CERES
400 CREAM (GRAM) TOPICAL NIGHTLY
Qty: 90 TABLET | Refills: 1 | Status: SHIPPED | OUTPATIENT
Start: 2023-01-31

## 2023-01-31 NOTE — TELEPHONE ENCOUNTER
[address]          Medication Refill  (Newest Message First)  Ger, Surescripts In routed conversation to Jonny Monique [facility] Staff 11 minutes ago (3:19 PM)      Future Appointments    Encounter Information    Provider Department Appt Notes   3/17/2023 Samra Dean, [address] [place] [address] Endocrinology 3 month     Past Visits    Date Provider Specialty Visit Type Primary Dx   12/21/2022 RUDY Hollins Endocrinology

## 2023-02-07 ENCOUNTER — TELEPHONE (OUTPATIENT)
Dept: GASTROENTEROLOGY | Age: 58
End: 2023-02-07

## 2023-03-08 ENCOUNTER — TELEPHONE (OUTPATIENT)
Dept: ENDOCRINOLOGY | Age: 58
End: 2023-03-08

## 2023-03-08 RX ORDER — SEMAGLUTIDE 1.34 MG/ML
1 INJECTION, SOLUTION SUBCUTANEOUS WEEKLY
Qty: 3 ML | Refills: 5 | Status: SHIPPED | OUTPATIENT
Start: 2023-03-08 | End: 2023-05-05 | Stop reason: ALTCHOICE

## 2023-03-17 ENCOUNTER — HOSPITAL ENCOUNTER (OUTPATIENT)
Dept: LAB | Age: 58
Discharge: HOME | End: 2023-03-17
Payer: COMMERCIAL

## 2023-03-17 DIAGNOSIS — R73.03 PRE-DIABETES: ICD-10-CM

## 2023-03-17 DIAGNOSIS — E28.2 PCOS (POLYCYSTIC OVARIAN SYNDROME): ICD-10-CM

## 2023-03-17 DIAGNOSIS — K76.0 FATTY LIVER: ICD-10-CM

## 2023-03-17 DIAGNOSIS — E66.01 CLASS 3 SEVERE OBESITY WITH SERIOUS COMORBIDITY AND BODY MASS INDEX (BMI) OF 50.0 TO 59.9 IN ADULT, UNSPECIFIED OBESITY TYPE (HCC): ICD-10-CM

## 2023-03-17 LAB
ALBUMIN SERPL-MCNC: 4.2 G/DL (ref 3.5–4.6)
ALP SERPL-CCNC: 82 U/L (ref 40–130)
ALT SERPL-CCNC: 44 U/L (ref 0–33)
ANION GAP SERPL CALCULATED.3IONS-SCNC: 10 MEQ/L (ref 9–15)
AST SERPL-CCNC: 38 U/L (ref 0–35)
BILIRUB SERPL-MCNC: 0.5 MG/DL (ref 0.2–0.7)
BUN SERPL-MCNC: 11 MG/DL (ref 6–20)
CALCIUM SERPL-MCNC: 9.7 MG/DL (ref 8.5–9.9)
CHLORIDE SERPL-SCNC: 105 MEQ/L (ref 95–107)
CHOLEST SERPL-MCNC: 87 MG/DL (ref 0–199)
CO2 SERPL-SCNC: 26 MEQ/L (ref 20–31)
CREAT SERPL-MCNC: 0.71 MG/DL (ref 0.5–0.9)
GLOBULIN SER CALC-MCNC: 3.5 G/DL (ref 2.3–3.5)
GLUCOSE SERPL-MCNC: 91 MG/DL (ref 70–99)
HBA1C MFR BLD: 6 % (ref 4.8–5.9)
HDLC SERPL-MCNC: 41 MG/DL (ref 40–59)
LDL CHOLESTEROL CALCULATED: 25 MG/DL (ref 0–129)
POTASSIUM SERPL-SCNC: 4.1 MEQ/L (ref 3.4–4.9)
PROT SERPL-MCNC: 7.7 G/DL (ref 6.3–8)
SODIUM SERPL-SCNC: 141 MEQ/L (ref 135–144)
TRIGLYCERIDE, FASTING: 105 MG/DL (ref 0–150)

## 2023-03-17 PROCEDURE — 36415 COLL VENOUS BLD VENIPUNCTURE: CPT

## 2023-03-17 PROCEDURE — 80061 LIPID PANEL: CPT

## 2023-03-17 PROCEDURE — 83527 ASSAY OF INSULIN: CPT

## 2023-03-17 PROCEDURE — 83036 HEMOGLOBIN GLYCOSYLATED A1C: CPT

## 2023-03-17 PROCEDURE — 80053 COMPREHEN METABOLIC PANEL: CPT

## 2023-03-17 PROCEDURE — 83525 ASSAY OF INSULIN: CPT

## 2023-03-20 LAB
INSULIN FREE SERPL-ACNC: 21 UIU/ML (ref 3–25)
INSULIN SERPL-ACNC: 26 UIU/ML (ref 3–25)

## 2023-05-05 ENCOUNTER — OFFICE VISIT (OUTPATIENT)
Dept: ENDOCRINOLOGY | Age: 58
End: 2023-05-05
Payer: COMMERCIAL

## 2023-05-05 ENCOUNTER — TELEPHONE (OUTPATIENT)
Dept: ENDOCRINOLOGY | Age: 58
End: 2023-05-05

## 2023-05-05 VITALS
HEIGHT: 68 IN | WEIGHT: 293 LBS | DIASTOLIC BLOOD PRESSURE: 80 MMHG | BODY MASS INDEX: 44.41 KG/M2 | SYSTOLIC BLOOD PRESSURE: 122 MMHG

## 2023-05-05 DIAGNOSIS — R73.03 PRE-DIABETES: ICD-10-CM

## 2023-05-05 DIAGNOSIS — E66.01 CLASS 3 SEVERE OBESITY WITH SERIOUS COMORBIDITY AND BODY MASS INDEX (BMI) OF 50.0 TO 59.9 IN ADULT, UNSPECIFIED OBESITY TYPE (HCC): Primary | ICD-10-CM

## 2023-05-05 PROCEDURE — 3074F SYST BP LT 130 MM HG: CPT | Performed by: PHYSICIAN ASSISTANT

## 2023-05-05 PROCEDURE — 1036F TOBACCO NON-USER: CPT | Performed by: PHYSICIAN ASSISTANT

## 2023-05-05 PROCEDURE — 3017F COLORECTAL CA SCREEN DOC REV: CPT | Performed by: PHYSICIAN ASSISTANT

## 2023-05-05 PROCEDURE — G8427 DOCREV CUR MEDS BY ELIG CLIN: HCPCS | Performed by: PHYSICIAN ASSISTANT

## 2023-05-05 PROCEDURE — 99214 OFFICE O/P EST MOD 30 MIN: CPT | Performed by: PHYSICIAN ASSISTANT

## 2023-05-05 PROCEDURE — 3079F DIAST BP 80-89 MM HG: CPT | Performed by: PHYSICIAN ASSISTANT

## 2023-05-05 PROCEDURE — G8417 CALC BMI ABV UP PARAM F/U: HCPCS | Performed by: PHYSICIAN ASSISTANT

## 2023-05-05 RX ORDER — SEMAGLUTIDE 2.68 MG/ML
1 INJECTION, SOLUTION SUBCUTANEOUS WEEKLY
Qty: 9 ML | Refills: 5 | Status: SHIPPED | OUTPATIENT
Start: 2023-05-05 | End: 2023-05-05 | Stop reason: SDUPTHER

## 2023-05-05 RX ORDER — SEMAGLUTIDE 2.68 MG/ML
2 INJECTION, SOLUTION SUBCUTANEOUS WEEKLY
Qty: 9 ML | Refills: 5 | Status: SHIPPED | OUTPATIENT
Start: 2023-05-05

## 2023-05-05 ASSESSMENT — ENCOUNTER SYMPTOMS
SINUS PRESSURE: 0
WHEEZING: 0
RHINORRHEA: 0
SHORTNESS OF BREATH: 0
DIARRHEA: 0

## 2023-05-05 NOTE — PATIENT INSTRUCTIONS
Endocrinology-    Check your blood sugars 4 times a day, before meals and at night  Document these numbers in a blood glucose log and bring them with you to your follow-up appointment. If you are prescribed insulin, Do not take your mealtime insulin if your blood sugars less than 120   Call our office if you have blood sugars less than 80 or greater then 300 on two or more occasions  Call our office if you have any questions regarding your blood sugars or insulin dosing regiment  Signs of low blood sugar may include tremors, feeling shaky, sweating, dizziness, confusion and weakness. Check your blood sugar immediatly if you have any of these symptoms.      The plan as discussed at your appointment-   Increase Ozempic 2.0 mg injected weekly   Glucometer ordered  Repeat labs and follow up in 6 months

## 2023-05-05 NOTE — PROGRESS NOTES
Disp: 90 tablet, Rfl: 1    losartan (COZAAR) 100 MG tablet, Take 1 tablet by mouth daily, Disp: 90 tablet, Rfl: 1    blood glucose monitor kit and supplies, 1 kit by Other route 2 times daily Pt test 4x daily Dx E11.65. May substitute for generic or insurance covered product, Disp: 1 kit, Rfl: 0    blood glucose monitor strips, 1 strip by Other route 2 times daily Pt test 4x daily Dx E11.65. May substitute for generic or insurance covered product, Disp: 100 strip, Rfl: 3    Lancets MISC, 1 each by Does not apply route 2 times daily Pt test 4x daily Dx E11.65. May substitute for generic or insurance covered product, Disp: 100 each, Rfl: 3  Lab Results   Component Value Date     03/17/2023    K 4.1 03/17/2023     03/17/2023    CO2 26 03/17/2023    BUN 11 03/17/2023    CREATININE 0.71 03/17/2023    GLUCOSE 91 03/17/2023    CALCIUM 9.7 03/17/2023    PROT 7.7 03/17/2023    LABALBU 4.2 03/17/2023    BILITOT 0.5 03/17/2023    ALKPHOS 82 03/17/2023    AST 38 (H) 03/17/2023    ALT 44 (H) 03/17/2023    LABGLOM >60.0 03/17/2023    GFRAA >60.0 05/05/2022    GLOB 3.5 03/17/2023     Lab Results   Component Value Date    WBC 5.9 12/13/2022    HGB 14.8 12/13/2022    HCT 44.8 12/13/2022    MCV 83.0 12/13/2022     12/13/2022     Lab Results   Component Value Date    LABA1C 6.0 (H) 03/17/2023    LABA1C 6.2 (H) 12/13/2022    LABA1C 5.9 05/05/2022       Lab Results   Component Value Date    CHOLFAST 87 03/17/2023    CHOLFAST 160 05/05/2022    TRIGLYCFAST 105 03/17/2023    TRIGLYCFAST 117 05/05/2022    HDL 41 03/17/2023    HDL 37 (L) 05/05/2022    LDLCALC 25 03/17/2023    LDLCALC 100 05/05/2022     No results found for: TESTOSTERONE, SHBG, TESTFREENM  No results found for: TSH, TSHREFLEX, FT3, T4FREE  No results found for: TPOABS    Review of Systems   HENT:  Negative for ear pain, postnasal drip, rhinorrhea and sinus pressure. Eyes:  Negative for visual disturbance.    Respiratory:  Negative for shortness of

## 2023-06-22 DIAGNOSIS — I10 ESSENTIAL HYPERTENSION: ICD-10-CM

## 2023-06-22 DIAGNOSIS — I10 ESSENTIAL (PRIMARY) HYPERTENSION: ICD-10-CM

## 2023-06-22 DIAGNOSIS — E78.2 MIXED HYPERLIPIDEMIA: ICD-10-CM

## 2023-06-22 NOTE — TELEPHONE ENCOUNTER
Pharmacy is requesting medication refill. Please approve or deny this request.    Rx requested:  Requested Prescriptions     Pending Prescriptions Disp Refills    losartan (COZAAR) 100 MG tablet [Pharmacy Med Name: Losartan Potassium 100mg Tablet] 90 tablet 0     Sig: Take 1 tablet by mouth daily.          Last Office Visit:   12/16/2022      Next Visit Date:  Future Appointments   Date Time Provider Cora Hsieh   11/10/2023  9:00 AM Yrn Figueredo, [address] [place] [address] Drive

## 2023-06-23 RX ORDER — AMLODIPINE BESYLATE 10 MG/1
10 TABLET ORAL DAILY
Qty: 90 TABLET | Refills: 0 | Status: SHIPPED | OUTPATIENT
Start: 2023-06-23

## 2023-06-23 RX ORDER — LOSARTAN POTASSIUM 100 MG/1
100 TABLET ORAL DAILY
Qty: 90 TABLET | Refills: 0 | Status: SHIPPED | OUTPATIENT
Start: 2023-06-23

## 2023-06-23 RX ORDER — ROSUVASTATIN CALCIUM 20 MG/1
20 TABLET, COATED ORAL DAILY
Qty: 90 TABLET | Refills: 0 | Status: SHIPPED | OUTPATIENT
Start: 2023-06-23

## 2023-06-23 RX ORDER — HYDROCHLOROTHIAZIDE 12.5 MG/1
12.5 CAPSULE, GELATIN COATED ORAL DAILY
Qty: 90 CAPSULE | Refills: 0 | Status: SHIPPED | OUTPATIENT
Start: 2023-06-23

## 2023-06-23 NOTE — TELEPHONE ENCOUNTER
Comments:     Last Office Visit (last PCP visit):   12/16/2022    Next Visit Date:  Future Appointments   Date Time Provider Cora Hsieh   11/10/2023  9:00 AM Nadyne Angelucci, [address]       **If hasn't been seen in over a year OR hasn't followed up according to last diabetes/ADHD visit, make appointment for patient before sending refill to provider. Rx requested:  Requested Prescriptions     Pending Prescriptions Disp Refills    hydroCHLOROthiazide (MICROZIDE) 12.5 MG capsule [Pharmacy Med Name: Hydrochlorothiazide 12.5mg Capsule] 90 capsule 0     Sig: Take 1 capsule by mouth daily. amLODIPine (NORVASC) 10 MG tablet [Pharmacy Med Name: Amlodipine Besylate 10mg Tablet] 90 tablet 0     Sig: Take 1 tablet by mouth daily. rosuvastatin (CRESTOR) 20 MG tablet [Pharmacy Med Name: Rosuvastatin Calcium 20mg Tablet] 90 tablet 0     Sig: Take 1 tablet by mouth daily.

## 2023-07-05 DIAGNOSIS — F41.1 GENERALIZED ANXIETY DISORDER: ICD-10-CM

## 2023-07-06 RX ORDER — ESCITALOPRAM OXALATE 20 MG/1
20 TABLET ORAL DAILY
Qty: 90 TABLET | Refills: 0 | Status: SHIPPED | OUTPATIENT
Start: 2023-07-06

## 2023-07-06 NOTE — TELEPHONE ENCOUNTER
Pharmacy is requesting medication refill. Please approve or deny this request.    Rx requested:  Requested Prescriptions     Pending Prescriptions Disp Refills    escitalopram (LEXAPRO) 20 MG tablet [Pharmacy Med Name: Escitalopram 20mg Tablet] 90 tablet 0     Sig: Take 1 tablet by mouth daily.          Last Office Visit:   12/16/2022      Next Visit Date:  Future Appointments   Date Time Provider [address]   11/10/2023  9:00 AM Carolyn Barbosa, [address]

## 2023-07-29 ENCOUNTER — PATIENT MESSAGE (OUTPATIENT)
Dept: FAMILY MEDICINE CLINIC | Age: 58
End: 2023-07-29

## 2023-07-29 DIAGNOSIS — Z12.31 ENCOUNTER FOR SCREENING MAMMOGRAM FOR MALIGNANT NEOPLASM OF BREAST: Primary | ICD-10-CM

## 2023-08-28 ENCOUNTER — HOSPITAL ENCOUNTER (OUTPATIENT)
Dept: WOMENS IMAGING | Age: 58
Discharge: HOME | End: 2023-08-30
Attending: FAMILY MEDICINE
Payer: COMMERCIAL

## 2023-08-28 DIAGNOSIS — Z12.31 ENCOUNTER FOR SCREENING MAMMOGRAM FOR MALIGNANT NEOPLASM OF BREAST: ICD-10-CM

## 2023-08-28 PROCEDURE — 77063 BREAST TOMOSYNTHESIS BI: CPT

## 2023-09-23 DIAGNOSIS — I10 ESSENTIAL HYPERTENSION: ICD-10-CM

## 2023-09-23 DIAGNOSIS — I10 ESSENTIAL (PRIMARY) HYPERTENSION: ICD-10-CM

## 2023-09-24 NOTE — TELEPHONE ENCOUNTER
Pharmacy is requesting medication refill. Please approve or deny this request.    Rx requested:  Requested Prescriptions     Pending Prescriptions Disp Refills    amLODIPine (NORVASC) 10 MG tablet [Pharmacy Med Name: Amlodipine Besylate 10mg Tablet] 90 tablet 0     Sig: Take 1 tablet by mouth daily. losartan (COZAAR) 100 MG tablet [Pharmacy Med Name: Losartan Potassium 100mg Tablet] 90 tablet 0     Sig: Take 1 tablet by mouth daily. hydroCHLOROthiazide (MICROZIDE) 12.5 MG capsule [Pharmacy Med Name: Hydrochlorothiazide 12.5mg Capsule] 90 capsule 0     Sig: Take 1 capsule by mouth daily.          Last Office Visit:   12/16/2022      Next Visit Date:  Future Appointments   Date Time Provider [address]   11/17/2023  9:45 AM Maxine Salgado, [address]

## 2023-09-25 DIAGNOSIS — E78.2 MIXED HYPERLIPIDEMIA: ICD-10-CM

## 2023-09-25 RX ORDER — LOSARTAN POTASSIUM 100 MG/1
100 TABLET ORAL DAILY
Qty: 90 TABLET | Refills: 0 | Status: SHIPPED | OUTPATIENT
Start: 2023-09-25

## 2023-09-25 RX ORDER — AMLODIPINE BESYLATE 10 MG/1
10 TABLET ORAL DAILY
Qty: 90 TABLET | Refills: 0 | Status: SHIPPED | OUTPATIENT
Start: 2023-09-25

## 2023-09-25 RX ORDER — HYDROCHLOROTHIAZIDE 12.5 MG/1
12.5 CAPSULE, GELATIN COATED ORAL DAILY
Qty: 90 CAPSULE | Refills: 0 | Status: SHIPPED | OUTPATIENT
Start: 2023-09-25

## 2023-09-25 NOTE — TELEPHONE ENCOUNTER
Pharmacy is requesting medication refill. Please approve or deny this request.    Rx requested:  Requested Prescriptions     Pending Prescriptions Disp Refills    rosuvastatin (CRESTOR) 20 MG tablet [Pharmacy Med Name: Rosuvastatin Calcium 20mg Tablet] 90 tablet 0     Sig: Take 1 tablet by mouth daily.          Last Office Visit:   12/16/2022      Next Visit Date:  Future Appointments   Date Time Provider [address]   11/17/2023  9:45 AM Shira Patinoe, [address]

## 2023-09-26 RX ORDER — ROSUVASTATIN CALCIUM 20 MG/1
20 TABLET, COATED ORAL DAILY
Qty: 90 TABLET | Refills: 0 | Status: SHIPPED | OUTPATIENT
Start: 2023-09-26

## 2023-10-13 ENCOUNTER — OFFICE VISIT (OUTPATIENT)
Dept: FAMILY MEDICINE CLINIC | Age: 58
End: 2023-10-13
Payer: COMMERCIAL

## 2023-10-13 VITALS
SYSTOLIC BLOOD PRESSURE: 126 MMHG | TEMPERATURE: 97.9 F | HEIGHT: 68 IN | DIASTOLIC BLOOD PRESSURE: 82 MMHG | WEIGHT: 293 LBS | HEART RATE: 80 BPM | OXYGEN SATURATION: 97 % | BODY MASS INDEX: 44.41 KG/M2

## 2023-10-13 DIAGNOSIS — F41.1 GENERALIZED ANXIETY DISORDER: ICD-10-CM

## 2023-10-13 DIAGNOSIS — E78.2 MIXED HYPERLIPIDEMIA: ICD-10-CM

## 2023-10-13 DIAGNOSIS — I10 ESSENTIAL HYPERTENSION: ICD-10-CM

## 2023-10-13 DIAGNOSIS — I10 ESSENTIAL (PRIMARY) HYPERTENSION: ICD-10-CM

## 2023-10-13 PROCEDURE — 1036F TOBACCO NON-USER: CPT | Performed by: FAMILY MEDICINE

## 2023-10-13 PROCEDURE — 3017F COLORECTAL CA SCREEN DOC REV: CPT | Performed by: FAMILY MEDICINE

## 2023-10-13 PROCEDURE — G8417 CALC BMI ABV UP PARAM F/U: HCPCS | Performed by: FAMILY MEDICINE

## 2023-10-13 PROCEDURE — G8484 FLU IMMUNIZE NO ADMIN: HCPCS | Performed by: FAMILY MEDICINE

## 2023-10-13 PROCEDURE — 3078F DIAST BP <80 MM HG: CPT | Performed by: FAMILY MEDICINE

## 2023-10-13 PROCEDURE — G8427 DOCREV CUR MEDS BY ELIG CLIN: HCPCS | Performed by: FAMILY MEDICINE

## 2023-10-13 PROCEDURE — 3074F SYST BP LT 130 MM HG: CPT | Performed by: FAMILY MEDICINE

## 2023-10-13 PROCEDURE — 99214 OFFICE O/P EST MOD 30 MIN: CPT | Performed by: FAMILY MEDICINE

## 2023-10-13 RX ORDER — ROSUVASTATIN CALCIUM 20 MG/1
20 TABLET, COATED ORAL DAILY
Qty: 90 TABLET | Refills: 0 | Status: SHIPPED | OUTPATIENT
Start: 2023-10-13

## 2023-10-13 RX ORDER — AMLODIPINE BESYLATE 10 MG/1
10 TABLET ORAL DAILY
Qty: 90 TABLET | Refills: 0 | Status: SHIPPED | OUTPATIENT
Start: 2023-10-13

## 2023-10-13 RX ORDER — LANOLIN ALCOHOL/MO/W.PET/CERES
400 CREAM (GRAM) TOPICAL NIGHTLY
Qty: 90 TABLET | Refills: 1 | Status: SHIPPED | OUTPATIENT
Start: 2023-10-13

## 2023-10-13 RX ORDER — LOSARTAN POTASSIUM 100 MG/1
100 TABLET ORAL DAILY
Qty: 90 TABLET | Refills: 0 | Status: SHIPPED | OUTPATIENT
Start: 2023-10-13

## 2023-10-13 RX ORDER — ESCITALOPRAM OXALATE 20 MG/1
20 TABLET ORAL DAILY
Qty: 90 TABLET | Refills: 0 | Status: SHIPPED | OUTPATIENT
Start: 2023-10-13

## 2023-10-13 RX ORDER — HYDROCHLOROTHIAZIDE 12.5 MG/1
12.5 CAPSULE, GELATIN COATED ORAL DAILY
Qty: 90 CAPSULE | Refills: 0 | Status: SHIPPED | OUTPATIENT
Start: 2023-10-13

## 2023-10-18 ASSESSMENT — ENCOUNTER SYMPTOMS
CHEST TIGHTNESS: 0
DIARRHEA: 0
BLOOD IN STOOL: 0
COUGH: 0
NAUSEA: 0
APNEA: 0
SHORTNESS OF BREATH: 0
CONSTIPATION: 0
VOMITING: 0
ABDOMINAL PAIN: 0

## 2023-10-19 NOTE — PROGRESS NOTES
Subjective:      Patient ID: Cherisse Collet is a 66 y.o. female who presents today for:     Chief Complaint   Patient presents with    Check-Up     Patient here for a check-up. No issues/concerns. HPI  Cherisse Collet is a very pleasant 32-year-old female presents today to follow-up on chronic conditions    Hypertension: Has been well controlled. Patient denies any chest pain, shortness of breath or lower extremity edema    Anxiety: Has been stable and controlled on Lexapro. Patient feels as though medication is effective and denies any suicidal or homicidal ideations    Hyperlipidemia. Patient has been adherent to Crestor has been on it for over 10 years.   Past Medical History:   Diagnosis Date    Anxiety     Endometriosis     Fatty liver     History of cervical cancer     HTN (hypertension)     Seasonal allergies      Past Surgical History:   Procedure Laterality Date    BREAST SURGERY       Family History   Problem Relation Age of Onset    High Blood Pressure Mother     Diabetes Mother         type 2    Diabetes Father         type 1    Heart Disease Father     High Blood Pressure Brother      Social History     Socioeconomic History    Marital status:      Spouse name: Not on file    Number of children: Not on file    Years of education: Not on file    Highest education level: Not on file   Occupational History    Not on file   Tobacco Use    Smoking status: Never    Smokeless tobacco: Never   Substance and Sexual Activity    Alcohol use: Yes     Comment: socially, rare    Drug use: Never    Sexual activity: Not on file   Other Topics Concern    Not on file   Social History Narrative    Not on file     Social Determinants of Health     Financial Resource Strain: Low Risk  (4/18/2022)    Overall Financial Resource Strain (CARDIA)     Difficulty of Paying Living Expenses: Not hard at all   Food Insecurity: No Food Insecurity (4/18/2022)    Hunger Vital Sign     Worried About Running Out of Food

## 2023-12-13 ENCOUNTER — HOSPITAL ENCOUNTER (OUTPATIENT)
Dept: LAB | Age: 58
Discharge: HOME | End: 2023-12-13
Payer: COMMERCIAL

## 2023-12-13 DIAGNOSIS — I10 ESSENTIAL HYPERTENSION: ICD-10-CM

## 2023-12-13 DIAGNOSIS — E78.2 MIXED HYPERLIPIDEMIA: ICD-10-CM

## 2023-12-13 DIAGNOSIS — R73.03 PRE-DIABETES: ICD-10-CM

## 2023-12-13 LAB
ALBUMIN SERPL-MCNC: 4.5 G/DL (ref 3.5–4.6)
ALP SERPL-CCNC: 89 U/L (ref 40–130)
ALT SERPL-CCNC: 33 U/L (ref 0–33)
ANION GAP SERPL CALCULATED.3IONS-SCNC: 10 MEQ/L (ref 9–15)
AST SERPL-CCNC: 35 U/L (ref 0–35)
BASOPHILS # BLD: 0 K/UL (ref 0–0.2)
BASOPHILS NFR BLD: 0.6 %
BILIRUB SERPL-MCNC: 0.6 MG/DL (ref 0.2–0.7)
BUN SERPL-MCNC: 15 MG/DL (ref 6–20)
CALCIUM SERPL-MCNC: 9.7 MG/DL (ref 8.5–9.9)
CHLORIDE SERPL-SCNC: 99 MEQ/L (ref 95–107)
CHOLEST SERPL-MCNC: 116 MG/DL (ref 0–199)
CO2 SERPL-SCNC: 29 MEQ/L (ref 20–31)
CREAT SERPL-MCNC: 0.67 MG/DL (ref 0.5–0.9)
EOSINOPHIL # BLD: 0.1 K/UL (ref 0–0.7)
EOSINOPHIL NFR BLD: 1.9 %
ERYTHROCYTE [DISTWIDTH] IN BLOOD BY AUTOMATED COUNT: 13.2 % (ref 11.5–14.5)
GLOBULIN SER CALC-MCNC: 4 G/DL (ref 2.3–3.5)
GLUCOSE SERPL-MCNC: 93 MG/DL (ref 70–99)
HBA1C MFR BLD: 6.3 % (ref 4.8–5.9)
HCT VFR BLD AUTO: 49.8 % (ref 37–47)
HDLC SERPL-MCNC: 44 MG/DL (ref 40–59)
HGB BLD-MCNC: 15.4 G/DL (ref 12–16)
LDL CHOLESTEROL CALCULATED: 51 MG/DL (ref 0–129)
LYMPHOCYTES # BLD: 1.3 K/UL (ref 1–4.8)
LYMPHOCYTES NFR BLD: 21.1 %
MCH RBC QN AUTO: 26.3 PG (ref 27–31.3)
MCHC RBC AUTO-ENTMCNC: 30.9 % (ref 33–37)
MCV RBC AUTO: 85.1 FL (ref 79.4–94.8)
MONOCYTES # BLD: 0.5 K/UL (ref 0.2–0.8)
MONOCYTES NFR BLD: 7.6 %
NEUTROPHILS # BLD: 4.2 K/UL (ref 1.4–6.5)
NEUTS SEG NFR BLD: 68.5 %
PLATELET # BLD AUTO: 273 K/UL (ref 130–400)
POTASSIUM SERPL-SCNC: 4.2 MEQ/L (ref 3.4–4.9)
PROT SERPL-MCNC: 8.5 G/DL (ref 6.3–8)
RBC # BLD AUTO: 5.85 M/UL (ref 4.2–5.4)
SODIUM SERPL-SCNC: 138 MEQ/L (ref 135–144)
TRIGLYCERIDE, FASTING: 107 MG/DL (ref 0–150)
WBC # BLD AUTO: 6.2 K/UL (ref 4.8–10.8)

## 2023-12-13 PROCEDURE — 80053 COMPREHEN METABOLIC PANEL: CPT

## 2023-12-13 PROCEDURE — 85025 COMPLETE CBC W/AUTO DIFF WBC: CPT

## 2023-12-13 PROCEDURE — 36415 COLL VENOUS BLD VENIPUNCTURE: CPT

## 2023-12-13 PROCEDURE — 80061 LIPID PANEL: CPT

## 2023-12-13 PROCEDURE — 83036 HEMOGLOBIN GLYCOSYLATED A1C: CPT

## 2023-12-15 ENCOUNTER — OFFICE VISIT (OUTPATIENT)
Dept: ENDOCRINOLOGY | Age: 58
End: 2023-12-15
Payer: COMMERCIAL

## 2023-12-15 VITALS
HEIGHT: 68 IN | OXYGEN SATURATION: 93 % | BODY MASS INDEX: 44.41 KG/M2 | WEIGHT: 293 LBS | SYSTOLIC BLOOD PRESSURE: 132 MMHG | HEART RATE: 85 BPM | DIASTOLIC BLOOD PRESSURE: 88 MMHG

## 2023-12-15 DIAGNOSIS — E28.2 PCOS (POLYCYSTIC OVARIAN SYNDROME): ICD-10-CM

## 2023-12-15 DIAGNOSIS — R73.03 PRE-DIABETES: Primary | ICD-10-CM

## 2023-12-15 DIAGNOSIS — E66.01 CLASS 3 SEVERE OBESITY WITH SERIOUS COMORBIDITY AND BODY MASS INDEX (BMI) OF 50.0 TO 59.9 IN ADULT, UNSPECIFIED OBESITY TYPE (HCC): ICD-10-CM

## 2023-12-15 PROCEDURE — 3079F DIAST BP 80-89 MM HG: CPT | Performed by: PHYSICIAN ASSISTANT

## 2023-12-15 PROCEDURE — G8484 FLU IMMUNIZE NO ADMIN: HCPCS | Performed by: PHYSICIAN ASSISTANT

## 2023-12-15 PROCEDURE — G8417 CALC BMI ABV UP PARAM F/U: HCPCS | Performed by: PHYSICIAN ASSISTANT

## 2023-12-15 PROCEDURE — 3017F COLORECTAL CA SCREEN DOC REV: CPT | Performed by: PHYSICIAN ASSISTANT

## 2023-12-15 PROCEDURE — G8427 DOCREV CUR MEDS BY ELIG CLIN: HCPCS | Performed by: PHYSICIAN ASSISTANT

## 2023-12-15 PROCEDURE — 1036F TOBACCO NON-USER: CPT | Performed by: PHYSICIAN ASSISTANT

## 2023-12-15 PROCEDURE — 3075F SYST BP GE 130 - 139MM HG: CPT | Performed by: PHYSICIAN ASSISTANT

## 2023-12-15 PROCEDURE — 99214 OFFICE O/P EST MOD 30 MIN: CPT | Performed by: PHYSICIAN ASSISTANT

## 2023-12-15 RX ORDER — SEMAGLUTIDE 2.68 MG/ML
INJECTION, SOLUTION SUBCUTANEOUS
Qty: 3 ML | Refills: 0 | OUTPATIENT
Start: 2023-12-15

## 2023-12-15 NOTE — PATIENT INSTRUCTIONS
Endocrinology-    Check your blood sugars 4 times a day, before meals and at night  Document these numbers in a blood glucose log and bring them with you to your follow-up appointment. If you are prescribed insulin, Do not take your mealtime insulin if your blood sugars less than 120   Call our office if you have blood sugars less than 80 or greater then 300 on two or more occasions  Call our office if you have any questions regarding your blood sugars or insulin dosing regiment  Signs of low blood sugar may include tremors, feeling shaky, sweating, dizziness, confusion and weakness. Check your blood sugar immediatly if you have any of these symptoms.      The plan as discussed at your appointment-      Ozempic 2.0 mg injected weekly   Glucometer being used  Repeat labs and follow up in 6 months

## 2023-12-15 NOTE — PROGRESS NOTES
12/15/2023    Assessment:       Diagnosis Orders   1. Pre-diabetes        2. PCOS (polycystic ovarian syndrome)        3. Class 3 severe obesity with serious comorbidity and body mass index (BMI) of 50.0 to 59.9 in adult, unspecified obesity type (HCC)            PLAN:     Ozempic 2.0 mg injected weekly   Glucometer being used  Repeat labs and follow up in 6 months     No orders of the defined types were placed in this encounter. No orders of the defined types were placed in this encounter. Return in about 6 months (around 6/15/2024) for Diabetes. Subjective:     Chief Complaint   Patient presents with    Obesity    Polycystic Ovarian Syndrome     Vitals:    12/15/23 1322   BP: 132/88   Site: Left Upper Arm   Pulse: 85   SpO2: 93%   Weight: 135.6 kg (299 lb)   Height: 1.727 m (5' 7.99\")     Wt Readings from Last 3 Encounters:   12/15/23 135.6 kg (299 lb)   10/13/23 (!) 137 kg (302 lb)   05/05/23 (!) 144.2 kg (318 lb)     BP Readings from Last 3 Encounters:   12/15/23 132/88   10/13/23 126/82   05/05/23 122/80     Patient is a 72-year-old obese female presents today for prediabetes and weight management. She has a history of PCOS with previous irregular menses, acanthosis, and insulin resistance. She is tried multiple diets she is very consistent with them eats healthy usually 90% of the time and is relatively active. Despite these lifestyle changes she is having difficulty losing weight. She is also prediabetic for the last year. Using GLP-1 with good success. 15 lb weight loss, feels much better overall.  Having difficulty getting Ozempic       Past Medical History:   Diagnosis Date    Anxiety     Endometriosis     Fatty liver     History of cervical cancer     HTN (hypertension)     Seasonal allergies      Past Surgical History:   Procedure Laterality Date    BREAST SURGERY       Social History     Socioeconomic History    Marital status:      Spouse name: Not on file    Number of children:

## 2024-01-12 ENCOUNTER — PATIENT MESSAGE (OUTPATIENT)
Dept: ENDOCRINOLOGY | Age: 59
End: 2024-01-12

## 2024-01-15 RX ORDER — SEMAGLUTIDE 2.4 MG/.75ML
2.4 INJECTION, SOLUTION SUBCUTANEOUS
Qty: 2 ML | Refills: 4 | Status: SHIPPED | OUTPATIENT
Start: 2024-01-15

## 2024-01-15 NOTE — TELEPHONE ENCOUNTER
I know pt was interest on a medication for weight loss, because it is a new year of insurance I did a PA for Zepbound but was not approved and on formulary as Wegovy so I did PA for it and it said no PA was need.  Patient is on Ozempic 2mg but has to pay out of pocket. I pended Wegovy 2.4 mg that it will be the next dose after 1.7mg on wegovy. Please advise or sign and send.      Rx requested:  Requested Prescriptions     Pending Prescriptions Disp Refills    Semaglutide-Weight Management (WEGOVY) 2.4 MG/0.75ML SOAJ SC injection 2 mL 4     Sig: Inject 2.4 mg into the skin every 7 days         Last Office Visit:   Visit date not found      Next Visit Date:  Future Appointments   Date Time Provider Department Center   6/14/2024  1:30 PM Fall, Darryl S, PA [facility]

## 2024-01-16 NOTE — TELEPHONE ENCOUNTER
Comments:     Last Office Visit (last PCP visit):   10/13/2023    Next Visit Date:  Future Appointments   Date Time Provider Department Center   6/14/2024  1:30 PM Fall, Darryl S, PA [facility]       **If hasn't been seen in over a year OR hasn't followed up according to last diabetes/ADHD visit, make appointment for patient before sending refill to provider.    Rx requested:  Requested Prescriptions     Pending Prescriptions Disp Refills    magnesium oxide (MAG-OX) 400 (240 Mg) MG tablet [Pharmacy Med Name: Magnesium Oxide 400mg Tablet] 90 tablet 0     Sig: Take 1 tablet by mouth at bedtime.

## 2024-01-17 DIAGNOSIS — I10 ESSENTIAL HYPERTENSION: ICD-10-CM

## 2024-01-17 RX ORDER — LANOLIN ALCOHOL/MO/W.PET/CERES
400 CREAM (GRAM) TOPICAL NIGHTLY
Qty: 90 TABLET | Refills: 0 | Status: SHIPPED | OUTPATIENT
Start: 2024-01-17

## 2024-01-17 RX ORDER — HYDROCHLOROTHIAZIDE 12.5 MG/1
12.5 CAPSULE, GELATIN COATED ORAL DAILY
Qty: 90 CAPSULE | Refills: 1 | Status: SHIPPED | OUTPATIENT
Start: 2024-01-17

## 2024-01-17 RX ORDER — LOSARTAN POTASSIUM 100 MG/1
100 TABLET ORAL DAILY
Qty: 90 TABLET | Refills: 1 | Status: SHIPPED | OUTPATIENT
Start: 2024-01-17

## 2024-01-17 RX ORDER — AMLODIPINE BESYLATE 10 MG/1
10 TABLET ORAL DAILY
Qty: 90 TABLET | Refills: 1 | Status: SHIPPED | OUTPATIENT
Start: 2024-01-17

## 2024-01-17 NOTE — TELEPHONE ENCOUNTER
Comments:     Last Office Visit (last PCP visit):   10/13/2023    Next Visit Date:  Future Appointments   Date Time Provider Department Center   6/14/2024  1:30 PM Fall, Darryl S, PA [facility]       **If hasn't been seen in over a year OR hasn't followed up according to last diabetes/ADHD visit, make appointment for patient before sending refill to provider.    Rx requested:  Requested Prescriptions     Pending Prescriptions Disp Refills    hydroCHLOROthiazide (MICROZIDE) 12.5 MG capsule 90 capsule 1     Sig: Take 1 capsule by mouth daily    amLODIPine (NORVASC) 10 MG tablet 90 tablet 1     Sig: Take 1 tablet by mouth daily    losartan (COZAAR) 100 MG tablet 90 tablet 1     Sig: Take 1 tablet by mouth daily

## 2024-03-01 DIAGNOSIS — E78.2 MIXED HYPERLIPIDEMIA: ICD-10-CM

## 2024-03-02 DIAGNOSIS — F41.1 GENERALIZED ANXIETY DISORDER: ICD-10-CM

## 2024-03-03 NOTE — TELEPHONE ENCOUNTER
Comments:     Last Office Visit (last PCP visit):   10/13/2023    Next Visit Date:  Future Appointments   Date Time Provider Department Center   6/14/2024  1:30 PM Fall, Darryl S, PA [facility]       **If hasn't been seen in over a year OR hasn't followed up according to last diabetes/ADHD visit, make appointment for patient before sending refill to provider.    Rx requested:  Requested Prescriptions     Pending Prescriptions Disp Refills    escitalopram (LEXAPRO) 20 MG tablet [Pharmacy Med Name: Escitalopram 20mg Tablet] 90 tablet 0     Sig: Take 1 tablet by mouth daily.

## 2024-03-03 NOTE — TELEPHONE ENCOUNTER
Comments:     Last Office Visit (last PCP visit):   10/13/2023    Next Visit Date:  Future Appointments   Date Time Provider Department Center   6/14/2024  1:30 PM Fall, Darryl S, PA [facility]       **If hasn't been seen in over a year OR hasn't followed up according to last diabetes/ADHD visit, make appointment for patient before sending refill to provider.    Rx requested:  Requested Prescriptions     Pending Prescriptions Disp Refills    rosuvastatin (CRESTOR) 20 MG tablet [Pharmacy Med Name: Rosuvastatin Calcium 20mg Tablet] 90 tablet 0     Sig: Take 1 tablet by mouth daily.

## 2024-03-05 RX ORDER — ESCITALOPRAM OXALATE 20 MG/1
20 TABLET ORAL DAILY
Qty: 90 TABLET | Refills: 0 | Status: SHIPPED | OUTPATIENT
Start: 2024-03-05

## 2024-03-05 RX ORDER — ROSUVASTATIN CALCIUM 20 MG/1
20 TABLET, COATED ORAL DAILY
Qty: 90 TABLET | Refills: 0 | Status: SHIPPED | OUTPATIENT
Start: 2024-03-05

## 2024-03-28 RX ORDER — SEMAGLUTIDE 2.68 MG/ML
INJECTION, SOLUTION SUBCUTANEOUS
Qty: 3 ML | Refills: 0 | Status: SHIPPED | OUTPATIENT
Start: 2024-03-28

## 2024-04-04 ENCOUNTER — TELEPHONE (OUTPATIENT)
Dept: ENDOCRINOLOGY | Age: 59
End: 2024-04-04

## 2024-05-19 DIAGNOSIS — E78.2 MIXED HYPERLIPIDEMIA: ICD-10-CM

## 2024-05-20 DIAGNOSIS — F41.1 GENERALIZED ANXIETY DISORDER: ICD-10-CM

## 2024-05-20 RX ORDER — ROSUVASTATIN CALCIUM 20 MG/1
20 TABLET, COATED ORAL DAILY
Qty: 90 TABLET | Refills: 0 | Status: SHIPPED | OUTPATIENT
Start: 2024-05-20

## 2024-05-20 NOTE — TELEPHONE ENCOUNTER
Comments:     Last Office Visit (last PCP visit):   10/13/2023    Next Visit Date:  Future Appointments   Date Time Provider Department Center   6/14/2024  1:30 PM Fall, Darryl S, PA [facility]       **If hasn't been seen in over a year OR hasn't followed up according to last diabetes/ADHD visit, make appointment for patient before sending refill to provider.    Rx requested:  Requested Prescriptions     Pending Prescriptions Disp Refills    rosuvastatin (CRESTOR) 20 MG tablet [Pharmacy Med Name: Rosuvastatin Calcium 20mg Tablet] 90 tablet 0     Sig: Take 1 tablet by mouth daily.

## 2024-05-21 RX ORDER — ESCITALOPRAM OXALATE 20 MG/1
20 TABLET ORAL DAILY
Qty: 90 TABLET | Refills: 0 | Status: SHIPPED | OUTPATIENT
Start: 2024-05-21

## 2024-05-21 NOTE — TELEPHONE ENCOUNTER
Comments:     Last Office Visit (last PCP visit):   10/13/2023    Next Visit Date:  Future Appointments   Date Time Provider Department Center   6/14/2024  1:30 PM Fall, Darryl S, PA [facility]       **If hasn't been seen in over a year OR hasn't followed up according to last diabetes/ADHD visit, make appointment for patient before sending refill to provider.    Rx requested:  Requested Prescriptions     Pending Prescriptions Disp Refills    escitalopram (LEXAPRO) 20 MG tablet [Pharmacy Med Name: Escitalopram 20mg Tablet] 90 tablet 0     Sig: Take 1 tablet by mouth daily.

## 2024-06-07 ENCOUNTER — OFFICE VISIT (OUTPATIENT)
Dept: ENDOCRINOLOGY | Age: 59
End: 2024-06-07
Payer: COMMERCIAL

## 2024-06-07 VITALS
DIASTOLIC BLOOD PRESSURE: 86 MMHG | BODY MASS INDEX: 45.99 KG/M2 | HEART RATE: 62 BPM | WEIGHT: 293 LBS | HEIGHT: 67 IN | SYSTOLIC BLOOD PRESSURE: 130 MMHG

## 2024-06-07 DIAGNOSIS — E28.2 PCOS (POLYCYSTIC OVARIAN SYNDROME): Primary | ICD-10-CM

## 2024-06-07 DIAGNOSIS — E66.01 CLASS 3 SEVERE OBESITY WITH SERIOUS COMORBIDITY AND BODY MASS INDEX (BMI) OF 50.0 TO 59.9 IN ADULT, UNSPECIFIED OBESITY TYPE (HCC): ICD-10-CM

## 2024-06-07 PROCEDURE — 3017F COLORECTAL CA SCREEN DOC REV: CPT | Performed by: PHYSICIAN ASSISTANT

## 2024-06-07 PROCEDURE — 3079F DIAST BP 80-89 MM HG: CPT | Performed by: PHYSICIAN ASSISTANT

## 2024-06-07 PROCEDURE — 99214 OFFICE O/P EST MOD 30 MIN: CPT | Performed by: PHYSICIAN ASSISTANT

## 2024-06-07 PROCEDURE — 1036F TOBACCO NON-USER: CPT | Performed by: PHYSICIAN ASSISTANT

## 2024-06-07 PROCEDURE — G8417 CALC BMI ABV UP PARAM F/U: HCPCS | Performed by: PHYSICIAN ASSISTANT

## 2024-06-07 PROCEDURE — G8427 DOCREV CUR MEDS BY ELIG CLIN: HCPCS | Performed by: PHYSICIAN ASSISTANT

## 2024-06-07 PROCEDURE — 3075F SYST BP GE 130 - 139MM HG: CPT | Performed by: PHYSICIAN ASSISTANT

## 2024-06-07 RX ORDER — SEMAGLUTIDE 2.68 MG/ML
2 INJECTION, SOLUTION SUBCUTANEOUS WEEKLY
Qty: 9 ML | Refills: 5 | Status: SHIPPED | OUTPATIENT
Start: 2024-06-07

## 2024-06-07 ASSESSMENT — ENCOUNTER SYMPTOMS
SHORTNESS OF BREATH: 0
DIARRHEA: 0
WHEEZING: 0
SINUS PRESSURE: 0
RHINORRHEA: 0

## 2024-06-07 NOTE — PROGRESS NOTES
6/7/2024    Assessment:       Diagnosis Orders   1. PCOS (polycystic ovarian syndrome)  Comprehensive Metabolic Panel    Hemoglobin A1C      2. Class 3 severe obesity with serious comorbidity and body mass index (BMI) of 50.0 to 59.9 in adult, unspecified obesity type (HCC)            PLAN:     Ozempic 2.0 mg injected weekly   Glucometer being used  Repeat labs and follow up in 6 months     Orders Placed This Encounter   Procedures    Comprehensive Metabolic Panel     Standing Status:   Future     Standing Expiration Date:   6/7/2025    Hemoglobin A1C     Standing Status:   Future     Standing Expiration Date:   6/7/2025       Orders Placed This Encounter   Medications    semaglutide, 2 MG/DOSE, (OZEMPIC, 2 MG/DOSE,) 8 MG/3ML SOPN sc injection     Sig: Inject 2 mg into the skin once a week     Dispense:  9 mL     Refill:  5       Return in about 6 months (around 12/7/2024) for weight management.  Subjective:     Chief Complaint   Patient presents with    Polycystic Ovarian Syndrome     Vitals:    06/07/24 1355   BP: 130/86   Pulse: 62   Weight: (!) 137.4 kg (303 lb)   Height: 1.702 m (5' 7\")     Wt Readings from Last 3 Encounters:   06/07/24 (!) 137.4 kg (303 lb)   12/15/23 135.6 kg (299 lb)   10/13/23 (!) 137 kg (302 lb)     BP Readings from Last 3 Encounters:   06/07/24 130/86   12/15/23 132/88   10/13/23 126/82     Patient is a 57-year-old obese female presents today for prediabetes and weight management.  She has a history of PCOS with previous irregular menses, acanthosis, and insulin resistance.  She is tried multiple diets she is very consistent with them eats healthy usually 90% of the time and is relatively active.  Despite these lifestyle changes she is having difficulty losing weight.  She is also prediabetic for the last year.    Laboratory studies were reviewed with the patient open A1c is 6.3%.  Continues to have good success with the GLP-1 and her weight loss along with her diet and fitness program.

## 2024-06-17 RX ORDER — LANOLIN ALCOHOL/MO/W.PET/CERES
400 CREAM (GRAM) TOPICAL NIGHTLY
Qty: 90 TABLET | Refills: 0 | Status: SHIPPED | OUTPATIENT
Start: 2024-06-17

## 2024-06-17 NOTE — TELEPHONE ENCOUNTER
Pharmacy is requesting medication refill. Please approve or deny this request.    Rx requested:  Requested Prescriptions     Pending Prescriptions Disp Refills    magnesium oxide (MAG-OX) 400 (240 Mg) MG tablet [Pharmacy Med Name: Magnesium Oxide 400mg Tablet] 90 tablet 0     Sig: Take 1 tablet by mouth at bedtime.         Last Office Visit:   10/13/2023      Next Visit Date:  Future Appointments   Date Time Provider Department Center   12/13/2024  9:45 AM Fall, Darryl S, PA [facility]

## 2024-08-05 RX ORDER — TIRZEPATIDE 7.5 MG/.5ML
7.5 INJECTION, SOLUTION SUBCUTANEOUS WEEKLY
Qty: 12 ADJUSTABLE DOSE PRE-FILLED PEN SYRINGE | Refills: 5 | Status: SHIPPED | OUTPATIENT
Start: 2024-08-05

## 2024-08-05 NOTE — PROGRESS NOTES
Patient requesting to be switched to Mounjaro from Ozempic.  A prescription was sent to her pharmacy today.

## 2024-09-13 DIAGNOSIS — I10 ESSENTIAL HYPERTENSION: ICD-10-CM

## 2024-09-16 RX ORDER — HYDROCHLOROTHIAZIDE 12.5 MG/1
12.5 CAPSULE ORAL DAILY
Qty: 90 CAPSULE | Refills: 0 | Status: SHIPPED | OUTPATIENT
Start: 2024-09-16